# Patient Record
Sex: MALE | Race: WHITE | NOT HISPANIC OR LATINO | Employment: OTHER | URBAN - METROPOLITAN AREA
[De-identification: names, ages, dates, MRNs, and addresses within clinical notes are randomized per-mention and may not be internally consistent; named-entity substitution may affect disease eponyms.]

---

## 2016-08-09 LAB — CRC RECOMMENDATION EXT: NORMAL

## 2021-09-23 ENCOUNTER — HISTORICAL (OUTPATIENT)
Dept: LAB | Facility: HOSPITAL | Age: 66
End: 2021-09-23

## 2021-09-23 LAB
ABS NEUT (OLG): 3.81
ALBUMIN SERPL-MCNC: 4.2 GM/DL (ref 3.4–4.8)
ALBUMIN/GLOB SERPL: 1 RATIO (ref 1.1–2)
ALP SERPL-CCNC: 86 UNIT/L (ref 40–150)
ALT SERPL-CCNC: 18 UNIT/L (ref 0–55)
APPEARANCE, UA: ABNORMAL
AST SERPL-CCNC: 26 UNIT/L (ref 5–34)
BACTERIA SPEC CULT: ABNORMAL
BASOPHILS # BLD AUTO: 0 X10(3)/MCL
BASOPHILS NFR BLD AUTO: 0 %
BILIRUB SERPL-MCNC: 1.8 MG/DL
BILIRUB UR QL STRIP: ABNORMAL
BILIRUBIN DIRECT+TOT PNL SERPL-MCNC: 0.6 MG/DL (ref 0–0.5)
BILIRUBIN DIRECT+TOT PNL SERPL-MCNC: 1.2 MG/DL
BUN SERPL-MCNC: 11 MG/DL (ref 8.4–25.7)
CALCIUM SERPL-MCNC: 9.8 MG/DL (ref 8.8–10)
CHLORIDE SERPL-SCNC: 104 MMOL/L (ref 98–107)
CHOLEST SERPL-MCNC: 183 MG/DL
CHOLEST/HDLC SERPL: 4 {RATIO} (ref 0–5)
CO2 SERPL-SCNC: 26 MEQ/L (ref 23–31)
COLOR UR: YELLOW
CREAT SERPL-MCNC: 0.81 MG/DL (ref 0.73–1.18)
EOSINOPHIL # BLD AUTO: 0.11 X10(3)/MCL
EOSINOPHIL NFR BLD AUTO: 2 %
ERYTHROCYTE [DISTWIDTH] IN BLOOD BY AUTOMATED COUNT: 13 %
GLOBULIN SER-MCNC: 4 GM/DL (ref 2.4–3.5)
GLUCOSE (UA): NEGATIVE
GLUCOSE SERPL-MCNC: 109 MG/DL (ref 82–115)
HCT VFR BLD AUTO: 44.8 % (ref 39–49)
HDLC SERPL-MCNC: 52 MG/DL (ref 35–60)
HGB BLD-MCNC: 15.2 GM/DL (ref 12.6–16.6)
HGB UR QL STRIP: ABNORMAL
IMM GRANULOCYTES # BLD AUTO: 0 10*3/UL (ref 0–0.1)
IMM GRANULOCYTES NFR BLD AUTO: 0 % (ref 0–1)
KETONES UR QL STRIP: ABNORMAL
LDLC SERPL CALC-MCNC: 114 MG/DL (ref 50–140)
LEUKOCYTE ESTERASE UR QL STRIP: NEGATIVE
LYMPHOCYTES # BLD AUTO: 1.22 X10(3)/MCL
LYMPHOCYTES NFR BLD AUTO: 22.2 %
MCH RBC QN AUTO: 33.9 PG (ref 27–33)
MCHC RBC AUTO-ENTMCNC: 33.9 GM/DL (ref 32–35)
MCV RBC AUTO: 99.8 FL (ref 84–97)
MONOCYTES # BLD AUTO: 0.35 X10(3)/MCL
MONOCYTES NFR BLD AUTO: 6.4 %
MUCOUS THREADS URNS QL MICRO: PRESENT
NEUTROPHILS # BLD AUTO: 3.81 X10(3)/MCL
NEUTROPHILS NFR BLD AUTO: 69.4 %
NITRITE UR QL STRIP: NEGATIVE
PH UR STRIP: 5.5 [PH] (ref 4.6–8)
PLATELET # BLD AUTO: 103 X10(3)/MCL (ref 140–450)
PMV BLD AUTO: 10 FL
POTASSIUM SERPL-SCNC: 3.5 MMOL/L (ref 3.5–5.1)
PROT SERPL-MCNC: 8.2 GM/DL (ref 5.8–7.6)
PROT UR QL STRIP: ABNORMAL
PSA SERPL-MCNC: 1.1 NG/ML
RBC # BLD AUTO: 4.49 X10(6)/MCL (ref 4.3–5.6)
RBC #/AREA URNS HPF: ABNORMAL /[HPF]
SODIUM SERPL-SCNC: 140 MMOL/L (ref 136–145)
SP GR UR STRIP: 1.02 (ref 1–1.03)
SQUAMOUS EPITHELIAL, UA: ABNORMAL
TRIGL SERPL-MCNC: 84 MG/DL (ref 34–140)
TSH SERPL-ACNC: 1.83 UIU/ML (ref 0.35–4.94)
UROBILINOGEN UR STRIP-ACNC: 0.2
VLDLC SERPL CALC-MCNC: 17 MG/DL
WBC # SPEC AUTO: 5.49 X10(3)/MCL (ref 3.4–9.2)
WBC #/AREA URNS HPF: ABNORMAL /HPF

## 2021-09-27 ENCOUNTER — HISTORICAL (OUTPATIENT)
Dept: LAB | Facility: HOSPITAL | Age: 66
End: 2021-09-27

## 2021-09-27 LAB
ABS NEUT (OLG): 3.6
BASOPHILS # BLD AUTO: 0.02 X10(3)/MCL
BASOPHILS NFR BLD AUTO: 0.4 %
EOSINOPHIL # BLD AUTO: 0.09 X10(3)/MCL
EOSINOPHIL NFR BLD AUTO: 1.6 %
ERYTHROCYTE [DISTWIDTH] IN BLOOD BY AUTOMATED COUNT: 13 %
FOLATE SERPL-MCNC: 12.7 NG/ML (ref 7–31.4)
HCT VFR BLD AUTO: 45.6 % (ref 39–49)
HGB BLD-MCNC: 15.4 GM/DL (ref 12.6–16.6)
IMM GRANULOCYTES # BLD AUTO: 0.01 10*3/UL (ref 0–0.1)
IMM GRANULOCYTES NFR BLD AUTO: 0.2 % (ref 0–1)
INR PPP: 1
LYMPHOCYTES # BLD AUTO: 1.36 X10(3)/MCL
LYMPHOCYTES NFR BLD AUTO: 24 %
MCH RBC QN AUTO: 33.9 PG (ref 27–33)
MCHC RBC AUTO-ENTMCNC: 33.8 GM/DL (ref 32–35)
MCV RBC AUTO: 100.4 FL (ref 84–97)
MONOCYTES # BLD AUTO: 0.58 X10(3)/MCL
MONOCYTES NFR BLD AUTO: 10.2 %
NEUTROPHILS # BLD AUTO: 3.6 X10(3)/MCL
NEUTROPHILS NFR BLD AUTO: 63.6 %
PLATELET # BLD AUTO: 147 X10(3)/MCL (ref 140–450)
PMV BLD AUTO: 9 FL
PROTHROMBIN TIME: 10.4 SECOND(S) (ref 9.1–10.9)
RBC # BLD AUTO: 4.54 X10(6)/MCL (ref 4.3–5.6)
VIT B12 SERPL-MCNC: 334 PG/ML (ref 213–816)
WBC # SPEC AUTO: 5.66 X10(3)/MCL (ref 3.4–9.2)

## 2021-09-28 ENCOUNTER — HISTORICAL (OUTPATIENT)
Dept: RADIOLOGY | Facility: HOSPITAL | Age: 66
End: 2021-09-28

## 2022-01-07 ENCOUNTER — HISTORICAL (OUTPATIENT)
Dept: RADIOLOGY | Facility: HOSPITAL | Age: 67
End: 2022-01-07

## 2022-01-10 ENCOUNTER — HISTORICAL (OUTPATIENT)
Dept: RADIOLOGY | Facility: HOSPITAL | Age: 67
End: 2022-01-10

## 2022-03-24 ENCOUNTER — HISTORICAL (OUTPATIENT)
Dept: LAB | Facility: HOSPITAL | Age: 67
End: 2022-03-24

## 2022-03-24 LAB
ABS NEUT (OLG): 3.64
BASOPHILS # BLD AUTO: 0.01 10*3/UL
BASOPHILS NFR BLD AUTO: 0.2 %
EOSINOPHIL # BLD AUTO: 0.14 10*3/UL
EOSINOPHIL NFR BLD AUTO: 2.4 %
ERYTHROCYTE [DISTWIDTH] IN BLOOD BY AUTOMATED COUNT: 14 %
HCT VFR BLD AUTO: 45.9 % (ref 39–49)
HGB BLD-MCNC: 15 G/DL (ref 12.6–16.6)
IMM GRANULOCYTES # BLD AUTO: 0.01 10*3/UL (ref 0–0.1)
IMM GRANULOCYTES NFR BLD AUTO: 0.2 % (ref 0–1)
LYMPHOCYTES # BLD AUTO: 1.62 10*3/UL
LYMPHOCYTES NFR BLD AUTO: 27.5 %
MANUAL DIFF? (OHS): NO
MCH RBC QN AUTO: 33.8 PG (ref 27–33)
MCHC RBC AUTO-ENTMCNC: 32.7 G/DL (ref 32–35)
MCV RBC AUTO: 103.4 FL (ref 84–97)
MONOCYTES # BLD AUTO: 0.48 10*3/UL
MONOCYTES NFR BLD AUTO: 8.1 %
NEUTROPHILS # BLD AUTO: 3.64 10*3/UL
NEUTROPHILS NFR BLD AUTO: 61.6 %
PLATELET # BLD AUTO: 182 10*3/UL (ref 140–450)
PMV BLD AUTO: 9 FL
RBC # BLD AUTO: 4.44 10*6/UL (ref 4.3–5.6)
WBC # SPEC AUTO: 5.9 10*3/UL (ref 3.4–9.2)

## 2022-04-11 ENCOUNTER — HISTORICAL (OUTPATIENT)
Dept: ADMINISTRATIVE | Facility: HOSPITAL | Age: 67
End: 2022-04-11
Payer: MEDICARE

## 2022-04-27 VITALS
HEIGHT: 68 IN | DIASTOLIC BLOOD PRESSURE: 79 MMHG | OXYGEN SATURATION: 99 % | SYSTOLIC BLOOD PRESSURE: 161 MMHG | BODY MASS INDEX: 22.05 KG/M2 | WEIGHT: 145.5 LBS

## 2022-05-26 DIAGNOSIS — Z00.00 WELLNESS EXAMINATION: Primary | ICD-10-CM

## 2022-05-26 DIAGNOSIS — D75.89 MACROCYTOSIS WITHOUT ANEMIA: ICD-10-CM

## 2022-05-26 DIAGNOSIS — E53.8 VITAMIN B12 DEFICIENCY: ICD-10-CM

## 2022-05-26 RX ORDER — LANOLIN ALCOHOL/MO/W.PET/CERES
1000 CREAM (GRAM) TOPICAL DAILY
COMMUNITY

## 2022-05-26 RX ORDER — ENALAPRIL MALEATE AND HYDROCHLOROTHIAZIDE 10; 25 MG/1; MG/1
1 TABLET ORAL DAILY
COMMUNITY
Start: 2021-09-21 | End: 2022-07-11 | Stop reason: SDUPTHER

## 2022-06-29 LAB — CRC RECOMMENDATION EXT: NORMAL

## 2022-07-11 DIAGNOSIS — I10 HYPERTENSION, UNSPECIFIED TYPE: Primary | ICD-10-CM

## 2022-07-11 RX ORDER — ENALAPRIL MALEATE AND HYDROCHLOROTHIAZIDE 10; 25 MG/1; MG/1
1 TABLET ORAL DAILY
Qty: 30 TABLET | Refills: 5 | Status: SHIPPED | OUTPATIENT
Start: 2022-07-11 | End: 2022-11-08

## 2022-11-08 ENCOUNTER — OFFICE VISIT (OUTPATIENT)
Dept: FAMILY MEDICINE | Facility: CLINIC | Age: 67
End: 2022-11-08
Payer: MEDICARE

## 2022-11-08 VITALS
TEMPERATURE: 98 F | DIASTOLIC BLOOD PRESSURE: 78 MMHG | BODY MASS INDEX: 21.7 KG/M2 | HEIGHT: 68 IN | WEIGHT: 143.19 LBS | RESPIRATION RATE: 18 BRPM | HEART RATE: 118 BPM | OXYGEN SATURATION: 98 % | SYSTOLIC BLOOD PRESSURE: 118 MMHG

## 2022-11-08 DIAGNOSIS — R09.82 POST-NASAL DRIP: ICD-10-CM

## 2022-11-08 DIAGNOSIS — R11.2 NAUSEA AND VOMITING, UNSPECIFIED VOMITING TYPE: Primary | ICD-10-CM

## 2022-11-08 PROBLEM — Z86.19 HISTORY OF HEPATITIS C VIRUS INFECTION: Status: ACTIVE | Noted: 2022-11-08

## 2022-11-08 PROBLEM — D75.89 MACROCYTOSIS WITHOUT ANEMIA: Status: ACTIVE | Noted: 2022-11-08

## 2022-11-08 PROBLEM — E53.8 VITAMIN B12 DEFICIENCY: Status: ACTIVE | Noted: 2022-11-08

## 2022-11-08 PROBLEM — G25.0 ESSENTIAL TREMOR: Status: ACTIVE | Noted: 2022-11-08

## 2022-11-08 PROBLEM — I10 HYPERTENSION: Status: ACTIVE | Noted: 2022-11-08

## 2022-11-08 PROBLEM — Z86.0100 HISTORY OF COLONIC POLYPS: Status: ACTIVE | Noted: 2022-11-08

## 2022-11-08 PROBLEM — Z86.010 HISTORY OF COLONIC POLYPS: Status: ACTIVE | Noted: 2022-11-08

## 2022-11-08 PROCEDURE — 3078F PR MOST RECENT DIASTOLIC BLOOD PRESSURE < 80 MM HG: ICD-10-PCS | Mod: CPTII,,, | Performed by: FAMILY MEDICINE

## 2022-11-08 PROCEDURE — 99213 OFFICE O/P EST LOW 20 MIN: CPT | Mod: ,,, | Performed by: FAMILY MEDICINE

## 2022-11-08 PROCEDURE — 3008F PR BODY MASS INDEX (BMI) DOCUMENTED: ICD-10-PCS | Mod: CPTII,,, | Performed by: FAMILY MEDICINE

## 2022-11-08 PROCEDURE — 1159F MED LIST DOCD IN RCRD: CPT | Mod: CPTII,,, | Performed by: FAMILY MEDICINE

## 2022-11-08 PROCEDURE — 99213 PR OFFICE/OUTPT VISIT, EST, LEVL III, 20-29 MIN: ICD-10-PCS | Mod: ,,, | Performed by: FAMILY MEDICINE

## 2022-11-08 PROCEDURE — 3074F PR MOST RECENT SYSTOLIC BLOOD PRESSURE < 130 MM HG: ICD-10-PCS | Mod: CPTII,,, | Performed by: FAMILY MEDICINE

## 2022-11-08 PROCEDURE — 3008F BODY MASS INDEX DOCD: CPT | Mod: CPTII,,, | Performed by: FAMILY MEDICINE

## 2022-11-08 PROCEDURE — 4010F PR ACE/ARB THEARPY RXD/TAKEN: ICD-10-PCS | Mod: CPTII,,, | Performed by: FAMILY MEDICINE

## 2022-11-08 PROCEDURE — 4010F ACE/ARB THERAPY RXD/TAKEN: CPT | Mod: CPTII,,, | Performed by: FAMILY MEDICINE

## 2022-11-08 PROCEDURE — 3074F SYST BP LT 130 MM HG: CPT | Mod: CPTII,,, | Performed by: FAMILY MEDICINE

## 2022-11-08 PROCEDURE — 1160F PR REVIEW ALL MEDS BY PRESCRIBER/CLIN PHARMACIST DOCUMENTED: ICD-10-PCS | Mod: CPTII,,, | Performed by: FAMILY MEDICINE

## 2022-11-08 PROCEDURE — 1160F RVW MEDS BY RX/DR IN RCRD: CPT | Mod: CPTII,,, | Performed by: FAMILY MEDICINE

## 2022-11-08 PROCEDURE — 1159F PR MEDICATION LIST DOCUMENTED IN MEDICAL RECORD: ICD-10-PCS | Mod: CPTII,,, | Performed by: FAMILY MEDICINE

## 2022-11-08 PROCEDURE — 3078F DIAST BP <80 MM HG: CPT | Mod: CPTII,,, | Performed by: FAMILY MEDICINE

## 2022-11-08 RX ORDER — NITROGLYCERIN 0.4 MG/1
TABLET SUBLINGUAL
COMMUNITY
Start: 2022-10-13

## 2022-11-08 RX ORDER — ASPIRIN 81 MG
81 TABLET,CHEWABLE ORAL DAILY
COMMUNITY
Start: 2022-10-13

## 2022-11-08 RX ORDER — ENALAPRIL MALEATE 10 MG/1
10 TABLET ORAL DAILY
COMMUNITY
Start: 2022-10-13 | End: 2023-05-10

## 2022-11-08 NOTE — PROGRESS NOTES
Patient ID: [id number]     Chief Complaint: Abdominal Cramping and Nausea        HPI:     Mina Da Silva is a 67 y.o. male here today for Abdominal Cramping and Nausea. Nausea started 2-3 weeks ago. Abdominal pain started on 11/4/22. Rates it as 2/10 on pain scale and states it is a dull pain. Pain has been present nearly constantly. Nothing makes it better or worse. Vomits mucous. Beltching helps with nausea.     Last colonoscopy was 2-3 months ago and nothing was found of concern he states. Has not had an EGD in the past.       ----------------------------  Essential tremor  History of hepatitis C  HTN (hypertension)  Macrocytosis without anemia  Personal history of colonic polyps      Comment:  s/p polypectomy Dr Tomas Casey  Vitamin B12 deficiency     Past Surgical History:   Procedure Laterality Date    CATARACT EXTRACTION  2020    COLONOSCOPY  2022    Dr Tomas Casey    COLONOSCOPY W/ BIOPSIES AND POLYPECTOMY  08/09/2016    3 polyps - Dr Tomas Casey    LUMBAR LAMINECTOMY WITH DISCECTOMY  02/18/2022    Dr Bruna Cesar    TONSILLECTOMY  1964       Review of patient's allergies indicates:   Allergen Reactions    Codeine Hives       Outpatient Medications Marked as Taking for the 11/8/22 encounter (Office Visit) with Robert Welch, DO   Medication Sig Dispense Refill    ASPIRIN CHILDRENS 81 mg Chew Take 81 mg by mouth once daily.      cyanocobalamin (VITAMIN B-12) 1000 MCG tablet Take 100 mcg by mouth once daily.      enalapril (VASOTEC) 10 MG tablet Take 10 mg by mouth once daily.      nitroGLYCERIN (NITROSTAT) 0.4 MG SL tablet Place under the tongue.         Social History     Socioeconomic History    Marital status:    Tobacco Use    Smoking status: Every Day     Packs/day: 1.00     Types: Cigarettes    Smokeless tobacco: Never   Substance and Sexual Activity    Alcohol use: Yes    Drug use: Never    Sexual activity: Yes        Family History   Problem Relation Age of Onset    Hypertension Mother      "Liver cancer Mother     Coronary artery disease Father         CABG x4    Heart disease Father     Hypertension Father     Stroke Sister     Stroke Brother         Patient Care Team:  Robert Welch DO as PCP - General (Family Medicine)  Lalo Mcdonnell MD as Consulting Physician (Cardiology)  Bruna Cesar MD as Consulting Physician (Neurosurgery)  Son STAN MD Carmela as Consulting Physician (Gastroenterology)     Subjective:     Review of Systems   Constitutional:  Negative for chills, fever and weight loss.   Respiratory:  Positive for shortness of breath.    Cardiovascular:  Negative for chest pain.   Gastrointestinal:  Positive for abdominal pain, heartburn, nausea and vomiting. Negative for blood in stool, constipation, diarrhea and melena.   Neurological:  Negative for dizziness and headaches.     See HPI for details  All Other ROS: Negative except as stated in HPI.       Objective:     /78   Pulse (!) 118   Temp 97.7 °F (36.5 °C) (Tympanic)   Resp 18   Ht 5' 7.72" (1.72 m)   Wt 65 kg (143 lb 3.2 oz)   SpO2 98%   BMI 21.96 kg/m²     Physical Exam  Vitals reviewed.   Constitutional:       General: He is not in acute distress.     Appearance: Normal appearance. He is not ill-appearing.   Cardiovascular:      Rate and Rhythm: Normal rate and regular rhythm.      Pulses: Normal pulses.      Heart sounds: Normal heart sounds. No murmur heard.    No friction rub. No gallop.   Pulmonary:      Effort: No respiratory distress.      Breath sounds: No wheezing, rhonchi or rales.   Abdominal:      General: Abdomen is flat. Bowel sounds are normal. There is no distension.      Palpations: There is no mass.      Tenderness: There is abdominal tenderness. There is no guarding.      Comments: RUQ   Musculoskeletal:         General: No swelling.      Right lower leg: No edema.      Left lower leg: No edema.   Skin:     General: Skin is warm and dry.   Neurological:      General: No focal deficit present.      " Mental Status: He is alert.   Psychiatric:         Mood and Affect: Mood normal.         Behavior: Behavior normal.       Assessment/Plan:     1. Nausea and vomiting, unspecified vomiting type    -Abdominal pain may be due to strain from vomiting. Advised patient to contact clinic if suddenly worsens.   -Patient recently had COVID. Suspect post-nasal drip as cause for nausea and vomiting of mucous. See below for plan for post-nasal drip  -If post-nasal drip symptoms improve and nausea persists. Recommend patient try Pepcid and Omeprazole over the counter. If this does not resolve symptoms or symptoms suddenly worsen will start generalized workup and refer to his GI doctor for possible EGD.   2. Post-nasal drip  -Recommended starting with Flonase and Mucinex for 2 weeks.     Follow up:     Follow up in about 3 months (around 2/8/2023) for Wellness. In addition to their scheduled follow up, the patient has also been instructed to follow up on as needed basis.

## 2022-11-28 ENCOUNTER — DOCUMENTATION ONLY (OUTPATIENT)
Dept: ADMINISTRATIVE | Facility: HOSPITAL | Age: 67
End: 2022-11-28
Payer: MEDICARE

## 2023-02-08 ENCOUNTER — OFFICE VISIT (OUTPATIENT)
Dept: FAMILY MEDICINE | Facility: CLINIC | Age: 68
End: 2023-02-08
Payer: MEDICARE

## 2023-02-08 ENCOUNTER — LAB VISIT (OUTPATIENT)
Dept: LAB | Facility: HOSPITAL | Age: 68
End: 2023-02-08
Attending: FAMILY MEDICINE
Payer: MEDICARE

## 2023-02-08 VITALS
OXYGEN SATURATION: 98 % | HEART RATE: 81 BPM | TEMPERATURE: 97 F | HEIGHT: 68 IN | DIASTOLIC BLOOD PRESSURE: 75 MMHG | WEIGHT: 147 LBS | SYSTOLIC BLOOD PRESSURE: 134 MMHG | BODY MASS INDEX: 22.28 KG/M2 | RESPIRATION RATE: 20 BRPM

## 2023-02-08 DIAGNOSIS — L57.0 ACTINIC KERATOSIS DUE TO EXPOSURE TO SUNLIGHT: ICD-10-CM

## 2023-02-08 DIAGNOSIS — R53.83 FATIGUE, UNSPECIFIED TYPE: ICD-10-CM

## 2023-02-08 DIAGNOSIS — I10 PRIMARY HYPERTENSION: ICD-10-CM

## 2023-02-08 DIAGNOSIS — Z00.00 ROUTINE GENERAL MEDICAL EXAMINATION AT A HEALTH CARE FACILITY: ICD-10-CM

## 2023-02-08 DIAGNOSIS — Z12.5 SCREENING FOR MALIGNANT NEOPLASM OF PROSTATE: ICD-10-CM

## 2023-02-08 DIAGNOSIS — Z00.00 ROUTINE GENERAL MEDICAL EXAMINATION AT A HEALTH CARE FACILITY: Primary | ICD-10-CM

## 2023-02-08 DIAGNOSIS — I10 PRIMARY HYPERTENSION: Chronic | ICD-10-CM

## 2023-02-08 DIAGNOSIS — Z13.6 SCREENING FOR CARDIOVASCULAR CONDITION: Primary | ICD-10-CM

## 2023-02-08 DIAGNOSIS — Z00.00 WELLNESS EXAMINATION: ICD-10-CM

## 2023-02-08 PROBLEM — B19.20 HEPATITIS C: Status: ACTIVE | Noted: 2023-02-08

## 2023-02-08 PROBLEM — D12.6 TUBULAR ADENOMA OF COLON: Status: ACTIVE | Noted: 2023-02-08

## 2023-02-08 PROBLEM — R74.8 ELEVATED LIVER ENZYMES: Status: ACTIVE | Noted: 2023-02-08

## 2023-02-08 PROBLEM — R06.83 SNORING: Status: ACTIVE | Noted: 2023-02-08

## 2023-02-08 PROBLEM — K64.8 INTERNAL HEMORRHOIDS: Status: ACTIVE | Noted: 2023-02-08

## 2023-02-08 PROBLEM — K57.30 DIVERTICULOSIS OF COLON: Status: ACTIVE | Noted: 2023-02-08

## 2023-02-08 PROBLEM — D12.6 BENIGN NEOPLASM OF COLON: Status: ACTIVE | Noted: 2023-02-08

## 2023-02-08 LAB
ALBUMIN SERPL-MCNC: 4.2 G/DL (ref 3.4–4.8)
ALBUMIN/GLOB SERPL: 1 RATIO (ref 1.1–2)
ALP SERPL-CCNC: 101 UNIT/L (ref 40–150)
ALT SERPL-CCNC: 13 UNIT/L (ref 0–55)
APPEARANCE UR: CLEAR
AST SERPL-CCNC: 21 UNIT/L (ref 5–34)
BACTERIA #/AREA URNS AUTO: ABNORMAL /HPF
BASOPHILS # BLD AUTO: 0.04 X10(3)/MCL (ref 0–0.2)
BASOPHILS NFR BLD AUTO: 0.4 %
BILIRUB UR QL STRIP.AUTO: NEGATIVE MG/DL
BILIRUBIN DIRECT+TOT PNL SERPL-MCNC: 1.1 MG/DL
BUN SERPL-MCNC: 9 MG/DL (ref 8.4–25.7)
CALCIUM SERPL-MCNC: 9.9 MG/DL (ref 8.8–10)
CHLORIDE SERPL-SCNC: 105 MMOL/L (ref 98–107)
CHOLEST SERPL-MCNC: 208 MG/DL
CHOLEST/HDLC SERPL: 4 {RATIO} (ref 0–5)
CO2 SERPL-SCNC: 25 MMOL/L (ref 23–31)
COLOR UR AUTO: YELLOW
CREAT SERPL-MCNC: 1.07 MG/DL (ref 0.73–1.18)
EOSINOPHIL # BLD AUTO: 0.16 X10(3)/MCL (ref 0–0.9)
EOSINOPHIL NFR BLD AUTO: 1.6 %
ERYTHROCYTE [DISTWIDTH] IN BLOOD BY AUTOMATED COUNT: 13.3 % (ref 11.5–17)
GFR SERPLBLD CREATININE-BSD FMLA CKD-EPI: >60 MLS/MIN/1.73/M2
GLOBULIN SER-MCNC: 4.2 GM/DL (ref 2.4–3.5)
GLUCOSE SERPL-MCNC: 109 MG/DL (ref 82–115)
GLUCOSE UR QL STRIP.AUTO: NEGATIVE MG/DL
HCT VFR BLD AUTO: 41.5 % (ref 42–52)
HDLC SERPL-MCNC: 49 MG/DL (ref 35–60)
HGB BLD-MCNC: 14.2 GM/DL (ref 14–18)
IMM GRANULOCYTES # BLD AUTO: 0.04 X10(3)/MCL (ref 0–0.04)
IMM GRANULOCYTES NFR BLD AUTO: 0.4 %
KETONES UR QL STRIP.AUTO: ABNORMAL MG/DL
LDLC SERPL CALC-MCNC: 142 MG/DL (ref 50–140)
LEUKOCYTE ESTERASE UR QL STRIP.AUTO: NEGATIVE UNIT/L
LYMPHOCYTES # BLD AUTO: 1.8 X10(3)/MCL (ref 0.6–4.6)
LYMPHOCYTES NFR BLD AUTO: 17.5 %
MCH RBC QN AUTO: 34.5 PG
MCHC RBC AUTO-ENTMCNC: 34.2 MG/DL (ref 33–36)
MCV RBC AUTO: 101 FL (ref 80–94)
MONOCYTES # BLD AUTO: 0.67 X10(3)/MCL (ref 0.1–1.3)
MONOCYTES NFR BLD AUTO: 6.5 %
MUCOUS THREADS URNS QL MICRO: ABNORMAL /LPF
NEUTROPHILS # BLD AUTO: 7.57 X10(3)/MCL (ref 2.1–9.2)
NEUTROPHILS NFR BLD AUTO: 73.6 %
NITRITE UR QL STRIP.AUTO: NEGATIVE
NRBC BLD AUTO-RTO: 0 %
PH UR STRIP.AUTO: 5.5 [PH]
PLATELET # BLD AUTO: 247 X10(3)/MCL (ref 130–400)
PMV BLD AUTO: 9 FL (ref 7.4–10.4)
POTASSIUM SERPL-SCNC: 4.7 MMOL/L (ref 3.5–5.1)
PROT SERPL-MCNC: 8.4 GM/DL (ref 5.8–7.6)
PROT UR QL STRIP.AUTO: NEGATIVE MG/DL
PSA SERPL-MCNC: 1.65 NG/ML
RBC # BLD AUTO: 4.11 X10(6)/MCL (ref 4.7–6.1)
RBC #/AREA URNS AUTO: ABNORMAL /HPF
RBC UR QL AUTO: NEGATIVE UNIT/L
SODIUM SERPL-SCNC: 140 MMOL/L (ref 136–145)
SP GR UR STRIP.AUTO: >=1.03
SQUAMOUS #/AREA URNS AUTO: ABNORMAL /HPF
TRIGL SERPL-MCNC: 85 MG/DL (ref 34–140)
TSH SERPL-ACNC: 2.04 UIU/ML (ref 0.35–4.94)
UROBILINOGEN UR STRIP-ACNC: 0.2 MG/DL
VLDLC SERPL CALC-MCNC: 17 MG/DL
WBC # SPEC AUTO: 10.3 X10(3)/MCL (ref 4.5–11.5)
WBC #/AREA URNS AUTO: ABNORMAL /HPF

## 2023-02-08 PROCEDURE — G0439 PR MEDICARE ANNUAL WELLNESS SUBSEQUENT VISIT: ICD-10-PCS | Mod: ,,, | Performed by: FAMILY MEDICINE

## 2023-02-08 PROCEDURE — 1160F PR REVIEW ALL MEDS BY PRESCRIBER/CLIN PHARMACIST DOCUMENTED: ICD-10-PCS | Mod: CPTII,,, | Performed by: FAMILY MEDICINE

## 2023-02-08 PROCEDURE — 1126F AMNT PAIN NOTED NONE PRSNT: CPT | Mod: CPTII,,, | Performed by: FAMILY MEDICINE

## 2023-02-08 PROCEDURE — 3288F FALL RISK ASSESSMENT DOCD: CPT | Mod: CPTII,,, | Performed by: FAMILY MEDICINE

## 2023-02-08 PROCEDURE — 3288F PR FALLS RISK ASSESSMENT DOCUMENTED: ICD-10-PCS | Mod: CPTII,,, | Performed by: FAMILY MEDICINE

## 2023-02-08 PROCEDURE — 81001 URINALYSIS AUTO W/SCOPE: CPT

## 2023-02-08 PROCEDURE — 85025 COMPLETE CBC W/AUTO DIFF WBC: CPT

## 2023-02-08 PROCEDURE — 1159F PR MEDICATION LIST DOCUMENTED IN MEDICAL RECORD: ICD-10-PCS | Mod: CPTII,,, | Performed by: FAMILY MEDICINE

## 2023-02-08 PROCEDURE — 80061 LIPID PANEL: CPT

## 2023-02-08 PROCEDURE — 3078F DIAST BP <80 MM HG: CPT | Mod: CPTII,,, | Performed by: FAMILY MEDICINE

## 2023-02-08 PROCEDURE — 3008F PR BODY MASS INDEX (BMI) DOCUMENTED: ICD-10-PCS | Mod: CPTII,,, | Performed by: FAMILY MEDICINE

## 2023-02-08 PROCEDURE — 80053 COMPREHEN METABOLIC PANEL: CPT

## 2023-02-08 PROCEDURE — 3075F PR MOST RECENT SYSTOLIC BLOOD PRESS GE 130-139MM HG: ICD-10-PCS | Mod: CPTII,,, | Performed by: FAMILY MEDICINE

## 2023-02-08 PROCEDURE — 1159F MED LIST DOCD IN RCRD: CPT | Mod: CPTII,,, | Performed by: FAMILY MEDICINE

## 2023-02-08 PROCEDURE — 84443 ASSAY THYROID STIM HORMONE: CPT

## 2023-02-08 PROCEDURE — 1101F PT FALLS ASSESS-DOCD LE1/YR: CPT | Mod: CPTII,,, | Performed by: FAMILY MEDICINE

## 2023-02-08 PROCEDURE — 36415 COLL VENOUS BLD VENIPUNCTURE: CPT

## 2023-02-08 PROCEDURE — 3008F BODY MASS INDEX DOCD: CPT | Mod: CPTII,,, | Performed by: FAMILY MEDICINE

## 2023-02-08 PROCEDURE — 1160F RVW MEDS BY RX/DR IN RCRD: CPT | Mod: CPTII,,, | Performed by: FAMILY MEDICINE

## 2023-02-08 PROCEDURE — 3078F PR MOST RECENT DIASTOLIC BLOOD PRESSURE < 80 MM HG: ICD-10-PCS | Mod: CPTII,,, | Performed by: FAMILY MEDICINE

## 2023-02-08 PROCEDURE — 3075F SYST BP GE 130 - 139MM HG: CPT | Mod: CPTII,,, | Performed by: FAMILY MEDICINE

## 2023-02-08 PROCEDURE — 1101F PR PT FALLS ASSESS DOC 0-1 FALLS W/OUT INJ PAST YR: ICD-10-PCS | Mod: CPTII,,, | Performed by: FAMILY MEDICINE

## 2023-02-08 PROCEDURE — 84153 ASSAY OF PSA TOTAL: CPT

## 2023-02-08 PROCEDURE — G0439 PPPS, SUBSEQ VISIT: HCPCS | Mod: ,,, | Performed by: FAMILY MEDICINE

## 2023-02-08 PROCEDURE — 1126F PR PAIN SEVERITY QUANTIFIED, NO PAIN PRESENT: ICD-10-PCS | Mod: CPTII,,, | Performed by: FAMILY MEDICINE

## 2023-02-08 NOTE — PROGRESS NOTES
Patient ID: [id number]     Chief Complaint: Medicare AWV        HPI:     Mina Da Silva is a 68 y.o. male here today for a Medicare Wellness. No other complaints today. Doing well overall today.         ----------------------------  Diverticulosis  Essential tremor  History of hepatitis C  HTN (hypertension)  Macrocytosis without anemia  Personal history of colonic polyps      Comment:  s/p polypectomy Dr Tomas Casey  Vitamin B12 deficiency     Past Surgical History:   Procedure Laterality Date    CATARACT EXTRACTION  2020    COLONOSCOPY  2022    Dr Tomas Casey    COLONOSCOPY W/ BIOPSIES AND POLYPECTOMY  08/09/2016    3 polyps - Dr Tomas Casey    LUMBAR LAMINECTOMY WITH DISCECTOMY  02/18/2022    Dr Bruna Cesar    TONSILLECTOMY  1964       Review of patient's allergies indicates:   Allergen Reactions    Codeine Hives       Outpatient Medications Marked as Taking for the 2/8/23 encounter (Office Visit) with Robert Welch DO   Medication Sig Dispense Refill    ASPIRIN CHILDRENS 81 mg Chew Take 81 mg by mouth once daily.      cyanocobalamin (VITAMIN B-12) 1000 MCG tablet Take 100 mcg by mouth once daily.      enalapril (VASOTEC) 10 MG tablet Take 10 mg by mouth once daily.      nitroGLYCERIN (NITROSTAT) 0.4 MG SL tablet Place under the tongue.         Social History     Socioeconomic History    Marital status:    Tobacco Use    Smoking status: Every Day     Packs/day: 0.25     Types: Cigarettes    Smokeless tobacco: Never   Substance and Sexual Activity    Alcohol use: Yes    Drug use: Never    Sexual activity: Yes        Family History   Problem Relation Age of Onset    Hypertension Mother     Liver cancer Mother     Coronary artery disease Father         CABG x4    Heart disease Father     Hypertension Father     Stroke Sister     Stroke Brother         Patient Care Team:  Robert Welch DO as PCP - General (Family Medicine)  Lalo Mcdonnell MD as Consulting Physician (Cardiology)  Bruna Cesar MD as  Consulting Physician (Neurosurgery)  Tomas PIERCE MD Carmela as Consulting Physician (Gastroenterology)  Hazel Kohli MD as Consulting Physician (Ophthalmology)       Subjective:     Review of Systems   Constitutional:  Negative for chills and fever.   Respiratory:  Negative for shortness of breath.    Cardiovascular:  Negative for chest pain.   Gastrointestinal:  Negative for constipation and diarrhea.   Musculoskeletal:  Negative for falls.   Neurological:  Negative for dizziness and headaches.   Psychiatric/Behavioral:  The patient does not have insomnia.        Patient Reported Health Risk Assessments:  What [age] 65-69  Are you male or female?: Male  During the past four weeks, how much have you been bothered by emotional problems such as feeling anxious, depressed, irritable, sad, or downhearted and blue?: Not at all  During the past five weeks, has your physical and/or emotional health limited your social activities with family, friends, neighbors, or groups?: Not at all  During the past four weeks, how much bodily pain have you generally had?: No pain  During the past four weeks, was someone available to help if you needed and wanted help?: Yes, as much as I wanted  During the past four weeks, what was the hardest physical activity you could do for at least two minutes?: Moderate  Can you get to places out of walking distance without help?  (For example, can you travel alone on buses or taxis, or drive your own car?): Yes  Can you go shopping for groceries or clothes without someone's help?: Yes  Can you prepare your own meals?: Yes  Can you do your own housework without help?: Yes  Because of any health problems, do you need the help of another person with your personal care needs such as eating, bathing, dressing, or getting around the house?: No  Can you handle your own money without help?: Yes  During the past four weeks, how would you rate your health in general?: Very good  How have things been  "going for you during the past four weeks?: Very well  Are you having difficulties driving your car?: No  Do you always fasten your seat belt when you are in a car?: Yes, usually  How often in the past four weeks have you been bothered by falling or dizzy when standing up?: Never  How often in the past four weeks have you been bothered by sexual problems?: Never  How often in the past four weeks have you been bothered by trouble eating well?: Never  How often in the past four weeks have you been bothered by teeth or denture problems?: Never  How often in the past four weeks have you been bothered with problems using the telephone?: Never  How often in the past four weeks have you been bothered by tiredness or fatigue?: Never  Have you fallen two or more times in the past year?: No  Are you afraid of falling?: No  Are you a smoker?: Yes, I'm not ready to quit  During the past four weeks, how many drinks of wine, beer, or other alcoholic beverages did you have?: No alcohol at all  Do you exercise for about 20 minutes three or more days a week?: Yes, some of the time  Have you been given any information to help you with hazards in your house that might hurt you?: Yes  Have you been given any information to help you with keeping track of your medications?: Yes  How often do you have trouble taking medicines the way you've been told to take them?: I always take them as prescribed  How confident are you that you can control and manage most of your health problems?: Very confident  What is your race? (Check all that apply.):     Objective:     /75 (BP Location: Right arm, Patient Position: Sitting, BP Method: Large (Automatic))   Pulse 81   Temp 97.2 °F (36.2 °C)   Resp 20   Ht 5' 8" (1.727 m)   Wt 66.7 kg (147 lb)   SpO2 98%   BMI 22.35 kg/m²     Physical Exam  Vitals reviewed.   Constitutional:       General: He is not in acute distress.     Appearance: Normal appearance. He is not ill-appearing. "   Cardiovascular:      Rate and Rhythm: Normal rate and regular rhythm.      Pulses: Normal pulses.      Heart sounds: Normal heart sounds. No murmur heard.    No friction rub. No gallop.   Pulmonary:      Effort: No respiratory distress.      Breath sounds: No wheezing, rhonchi or rales.   Musculoskeletal:         General: No swelling.      Right lower leg: No edema.      Left lower leg: No edema.   Skin:     General: Skin is warm and dry.      Findings: Lesion present.   Neurological:      General: No focal deficit present.      Mental Status: He is alert.   Psychiatric:         Mood and Affect: Mood normal.         Behavior: Behavior normal.           Assessment:       ICD-10-CM ICD-9-CM   1. Routine general medical examination at a health care facility  Z00.00 V70.0   2. Actinic keratosis due to exposure to sunlight  L57.0 702.0   3. Screening for malignant neoplasm of prostate  Z12.5 V76.44   4. Primary hypertension  I10 401.9        Plan:       Medicare Annual Wellness and Personalized Prevention Plan:     Fall Risk + Home Safety + Living Situation + Whisper Test + Depression Screen + CAGE + Cognitive Impairment Screen + ADL Screen + Timed Get Up and Go + Nutrition Screen + PAQ Screen + Health Risk Assessment all reviewed.     No flowsheet data found.  Fall Risk Assessment - Outpatient 2/8/2023   Mobility Status Ambulatory   Number of falls 0   Identified as fall risk 0                   Depression Screening  Over the past two weeks, has the patient felt down, depressed, or hopeless?: No  Over the past two weeks, has the patient felt little interest or pleasure in doing things?: No  Functional Ability/Safety Screening  Was the patient's timed Up & Go test unsteady or longer than 30 seconds?: No  Does the patient need help with phone, transportation, shopping, preparing meals, housework, laundry, meds, or managing money?: No  Does the patient's home have rugs in the hallway, lack grab bars in the bathroom, lack  handrails on the stairs or have poor lighting?: No  Cognitive Function (Assessed through direct observation with due consideration of information obtained by way of patient reports and/or concerns raised by family, friends, caretakers, or others)    Does the patient repeat questions/statements in the same day?: No  Does the patient have trouble remembering the date, year, and time?: No  Does the patient have difficulty managing finances?: No  Does the patient have a decreased sense of direction?: No         Alcohol/Tobacco Use - Stressed importance of smoking cessation and limiting alcohol intake.  CVD Risk Factors - Reviewed  Obesity/Physical Activity - Normal BMI. Encouraged daily 30 minute physical activity x 5 days per week.    Opioid Screening: Patient medication list reviewed, patient is not taking prescription opioids. Patient is not using additional opioids than prescribed. Patient is at low risk of substance abuse based on this opioid use history.        Health Maintenance Topics with due status: Not Due       Topic Last Completion Date    Lipid Panel 09/23/2021    Colorectal Cancer Screening 06/29/2022          Prostate Cancer Screening - Will order PSA today.  Follow up annually.   Lab Results   Component Value Date    PSA 1.10 09/23/2021        Colon Cancer Screening - Colonoscopy on 6/29/22.   Eye Exam - Recommend annually  Dental Exam - Recommend biannually  Vaccinations -   Immunization History   Administered Date(s) Administered    COVID-19, MRNA, LN-S, PF (MODERNA FULL 0.5 ML DOSE) 06/15/2021, 08/19/2021        Advance Care Planning   Advanced Care Planning: I offered to discuss Advanced Care Planning, which consists of how you would like to be cared for as you end the near of your natural life.  This includes how to pick a person who would make decisions for you if you were unable to make them for yourself, which is called a Medical Power of . These discussions include what kind of  decisions you will make regarding life sustaining measures, such as ventilators and feeding tubes, when faced with a life limiting illness recorded on a living will that they will need to know.  Spent 20 minutes with the patient/family on the importance of Advanced Care Planning.          1. Routine general medical examination at a health care facility  - Comprehensive Metabolic Panel; Future  - Lipid Panel; Future  - TSH; Future    2. Actinic keratosis due to exposure to sunlight  - Ambulatory referral/consult to Dermatology; Future    3. Screening for malignant neoplasm of prostate  - PSA, Screening; Future    4. Primary hypertension  - CBC Auto Differential; Future        Medication List with Changes/Refills   Current Medications    ASPIRIN CHILDRENS 81 MG CHEW    Take 81 mg by mouth once daily.       Start Date: 10/13/2022End Date: --    CYANOCOBALAMIN (VITAMIN B-12) 1000 MCG TABLET    Take 100 mcg by mouth once daily.       Start Date: --        End Date: --    ENALAPRIL (VASOTEC) 10 MG TABLET    Take 10 mg by mouth once daily.       Start Date: 10/13/2022End Date: --    NITROGLYCERIN (NITROSTAT) 0.4 MG SL TABLET    Place under the tongue.       Start Date: 10/13/2022End Date: --          The patient's Health Maintenance was reviewed and the following appears to be due at this time:   Health Maintenance Due   Topic Date Due    Hepatitis C Screening  Never done    Pneumococcal Vaccines (Age 65+) (1 - PCV) Never done    TETANUS VACCINE  Never done    Shingles Vaccine (1 of 2) Never done    COVID-19 Vaccine (3 - Booster for Moderna series) 10/14/2021    Influenza Vaccine (1) Never done           Follow up in about 6 months (around 8/8/2023). In addition to their scheduled follow up, the patient has also been instructed to follow up on as needed basis.     Provided patient with a 5-10 year written screening schedule and personal prevention plan. Recommendations were developed using the USPSTF age appropriate  recommendations. Education, counseling, and referrals were provided as needed. After Visit Summary printed and given to patient which includes a list of additional screenings\tests needed.

## 2023-02-17 ENCOUNTER — DOCUMENTATION ONLY (OUTPATIENT)
Dept: FAMILY MEDICINE | Facility: CLINIC | Age: 68
End: 2023-02-17
Payer: MEDICARE

## 2023-02-24 ENCOUNTER — TELEPHONE (OUTPATIENT)
Dept: FAMILY MEDICINE | Facility: CLINIC | Age: 68
End: 2023-02-24
Payer: MEDICARE

## 2023-02-24 NOTE — TELEPHONE ENCOUNTER
----- Message from Robert Welch DO sent at 2/23/2023  4:40 PM CST -----  All lab results within acceptable ranges.

## 2023-05-10 DIAGNOSIS — I10 ESSENTIAL (PRIMARY) HYPERTENSION: ICD-10-CM

## 2023-05-10 RX ORDER — ENALAPRIL MALEATE 10 MG/1
TABLET ORAL
Qty: 90 TABLET | Refills: 2 | Status: SHIPPED | OUTPATIENT
Start: 2023-05-10 | End: 2024-02-20

## 2023-08-08 ENCOUNTER — OFFICE VISIT (OUTPATIENT)
Dept: FAMILY MEDICINE | Facility: CLINIC | Age: 68
End: 2023-08-08
Payer: MEDICARE

## 2023-08-08 VITALS
DIASTOLIC BLOOD PRESSURE: 74 MMHG | RESPIRATION RATE: 18 BRPM | OXYGEN SATURATION: 97 % | BODY MASS INDEX: 23.34 KG/M2 | TEMPERATURE: 98 F | HEIGHT: 68 IN | WEIGHT: 154 LBS | HEART RATE: 92 BPM | SYSTOLIC BLOOD PRESSURE: 117 MMHG

## 2023-08-08 DIAGNOSIS — I10 PRIMARY HYPERTENSION: ICD-10-CM

## 2023-08-08 DIAGNOSIS — I10 PRIMARY HYPERTENSION: Primary | Chronic | ICD-10-CM

## 2023-08-08 DIAGNOSIS — Z12.5 SCREENING FOR MALIGNANT NEOPLASM OF PROSTATE: ICD-10-CM

## 2023-08-08 DIAGNOSIS — Z00.00 ROUTINE GENERAL MEDICAL EXAMINATION AT A HEALTH CARE FACILITY: Primary | ICD-10-CM

## 2023-08-08 PROCEDURE — 1126F AMNT PAIN NOTED NONE PRSNT: CPT | Mod: CPTII,,, | Performed by: FAMILY MEDICINE

## 2023-08-08 PROCEDURE — 3008F PR BODY MASS INDEX (BMI) DOCUMENTED: ICD-10-PCS | Mod: CPTII,,, | Performed by: FAMILY MEDICINE

## 2023-08-08 PROCEDURE — 3078F PR MOST RECENT DIASTOLIC BLOOD PRESSURE < 80 MM HG: ICD-10-PCS | Mod: CPTII,,, | Performed by: FAMILY MEDICINE

## 2023-08-08 PROCEDURE — 3008F BODY MASS INDEX DOCD: CPT | Mod: CPTII,,, | Performed by: FAMILY MEDICINE

## 2023-08-08 PROCEDURE — 99213 PR OFFICE/OUTPT VISIT, EST, LEVL III, 20-29 MIN: ICD-10-PCS | Mod: ,,, | Performed by: FAMILY MEDICINE

## 2023-08-08 PROCEDURE — 1159F MED LIST DOCD IN RCRD: CPT | Mod: CPTII,,, | Performed by: FAMILY MEDICINE

## 2023-08-08 PROCEDURE — 99213 OFFICE O/P EST LOW 20 MIN: CPT | Mod: ,,, | Performed by: FAMILY MEDICINE

## 2023-08-08 PROCEDURE — 1160F RVW MEDS BY RX/DR IN RCRD: CPT | Mod: CPTII,,, | Performed by: FAMILY MEDICINE

## 2023-08-08 PROCEDURE — 1160F PR REVIEW ALL MEDS BY PRESCRIBER/CLIN PHARMACIST DOCUMENTED: ICD-10-PCS | Mod: CPTII,,, | Performed by: FAMILY MEDICINE

## 2023-08-08 PROCEDURE — 3074F SYST BP LT 130 MM HG: CPT | Mod: CPTII,,, | Performed by: FAMILY MEDICINE

## 2023-08-08 PROCEDURE — 3074F PR MOST RECENT SYSTOLIC BLOOD PRESSURE < 130 MM HG: ICD-10-PCS | Mod: CPTII,,, | Performed by: FAMILY MEDICINE

## 2023-08-08 PROCEDURE — 4010F PR ACE/ARB THEARPY RXD/TAKEN: ICD-10-PCS | Mod: CPTII,,, | Performed by: FAMILY MEDICINE

## 2023-08-08 PROCEDURE — 4010F ACE/ARB THERAPY RXD/TAKEN: CPT | Mod: CPTII,,, | Performed by: FAMILY MEDICINE

## 2023-08-08 PROCEDURE — 3078F DIAST BP <80 MM HG: CPT | Mod: CPTII,,, | Performed by: FAMILY MEDICINE

## 2023-08-08 PROCEDURE — 1159F PR MEDICATION LIST DOCUMENTED IN MEDICAL RECORD: ICD-10-PCS | Mod: CPTII,,, | Performed by: FAMILY MEDICINE

## 2023-08-08 PROCEDURE — 1126F PR PAIN SEVERITY QUANTIFIED, NO PAIN PRESENT: ICD-10-PCS | Mod: CPTII,,, | Performed by: FAMILY MEDICINE

## 2023-08-08 NOTE — PROGRESS NOTES
Patient ID: [id number]     Chief Complaint: Follow-up        HPI:     Mina Da Silva is a 68 y.o. male here today for Follow-up for chronic medical conditions. Recently had melanoma and basal cell carcinoma removed. He stopped taking his BP medication and has noticed his bp has been averaging around 110-120 systolic.       ----------------------------  Basal cell carcinoma (BCC)  Diverticulosis  Essential tremor  History of hepatitis C  HTN (hypertension)  Internal hemorrhoids  Macrocytosis without anemia  Melanoma  Personal history of colonic polyps      Comment:  s/p polypectomy Dr Tomas Casey  Vitamin B12 deficiency     Past Surgical History:   Procedure Laterality Date    BASAL CELL CARCINOMA EXCISION  03/2023    Dr Charles Santamaria    CATARACT EXTRACTION  2020    CLOSURE OF DEFECT OF MOHS PROCEDURE Left 04/03/2023    upper lip - Dr Charles Santamaria    COLONOSCOPY  06/29/2022    Dr Tomas Casey    COLONOSCOPY W/ BIOPSIES AND POLYPECTOMY  08/09/2016    3 polyps - Dr Tomas Casey    EXCISION OF MELANOMA Right 07/10/2023    t[place] - Dr Charles Santamaria    LUMBAR LAMINECTOMY WITH DISCECTOMY  02/18/2022    Dr Bruna Cesar    TONSILLECTOMY  1964       Review of patient's allergies indicates:   Allergen Reactions    Codeine Hives       Outpatient Medications Marked as Taking for the 8/8/23 encounter (Office Visit) with Robert Welch, DO   Medication Sig Dispense Refill    ASPIRIN CHILDRENS 81 mg Chew Take 81 mg by mouth once daily.      cyanocobalamin (VITAMIN B-12) 1000 MCG tablet Take 100 mcg by mouth once daily.      enalapril (VASOTEC) 10 MG tablet TAKE 1 TABLET BY MOUTH EVERY DAY 90 tablet 2    nitroGLYCERIN (NITROSTAT) 0.4 MG SL tablet Place under the tongue.         Social History     Socioeconomic History    Marital status:    Tobacco Use    Smoking status: Every Day     Current packs/day: 0.25     Types: Cigarettes    Smokeless tobacco: Never   Substance and Sexual Activity    Alcohol use: Yes    Drug use: Never     Sexual activity: Yes     Social Determinants of Health     Financial Resource Strain: Low Risk  (8/8/2023)    Overall Financial Resource Strain (CARDIA)     Difficulty of Paying Living Expenses: Not hard at all   Food Insecurity: No Food Insecurity (8/8/2023)    Hunger Vital Sign     Worried About Running Out of Food in the Last Year: Never true     Ran Out of Food in the Last Year: Never true   Transportation Needs: No Transportation Needs (8/8/2023)    PRAPARE - Transportation     Lack of Transportation (Medical): No     Lack of Transportation (Non-Medical): No   Physical Activity: Sufficiently Active (8/8/2023)    Exercise Vital Sign     Days of Exercise per Week: 5 days     Minutes of Exercise per Session: 30 min   Stress: No Stress Concern Present (8/8/2023)    [employer] [place] [employer] - Occupational Stress Questionnaire     Feeling of Stress : Not at all   Social Connections: Socially Integrated (8/8/2023)    Social Connection and Isolation Panel [NHANES]     Frequency of Communication with Friends and Family: More than three times a week     Frequency of Social Gatherings with Friends and Family: Three times a week     Attends Buddhist Services: 1 to 4 times per year     Active Member of Clubs or Organizations: No     Attends Club or Organization Meetings: 1 to 4 times per year     Marital Status:    Housing Stability: Low Risk  (8/8/2023)    Housing Stability Vital Sign     Unable to Pay for Housing in the Last Year: No     Number of Places Lived in the Last Year: 1     Unstable Housing in the Last Year: No        Family History   Problem Relation Age of Onset    Hypertension Mother     Liver cancer Mother     Coronary artery disease Father         CABG x4    Heart disease Father     Hypertension Father     Stroke Sister     Stroke Brother         Patient Care Team:  Robert Welch DO as PCP - General (Family Medicine)  Lalo Mcdonnell MD as Consulting Physician  "(Cardiology)  Bruna Cesar MD as Consulting Physician (Neurosurgery)  Tomas Casey MD as Consulting Physician (Gastroenterology)  Hazel Kohli MD as Consulting Physician (Ophthalmology)  Aida Lombardo MD (Dermatology)  Charles Santamaria MD as Consulting Physician (Dermatology)     Subjective:     Review of Systems   Constitutional:  Negative for chills and fever.   Respiratory:  Negative for shortness of breath.    Cardiovascular:  Negative for chest pain.   Gastrointestinal:  Negative for constipation and diarrhea.   Neurological:  Negative for headaches.   Psychiatric/Behavioral:  The patient does not have insomnia.        See HPI for details  All Other ROS: Negative except as stated in HPI.       Objective:     /74   Pulse 92   Temp 98.4 °F (36.9 °C) (Tympanic)   Resp 18   Ht 5' 7.72" (1.72 m)   Wt 69.9 kg (154 lb)   SpO2 97%   BMI 23.61 kg/m²     Physical Exam  Vitals reviewed.   Constitutional:       General: He is not in acute distress.     Appearance: Normal appearance. He is not ill-appearing.   Cardiovascular:      Rate and Rhythm: Normal rate and regular rhythm.      Pulses: Normal pulses.      Heart sounds: Normal heart sounds. No murmur heard.     No friction rub. No gallop.   Pulmonary:      Effort: No respiratory distress.      Breath sounds: No wheezing, rhonchi or rales.   Musculoskeletal:         General: No swelling.      Right lower leg: No edema.      Left lower leg: No edema.   Skin:     General: Skin is warm and dry.   Neurological:      General: No focal deficit present.      Mental Status: He is alert.   Psychiatric:         Mood and Affect: Mood normal.         Behavior: Behavior normal.         Assessment/Plan:     1. Primary hypertension      Will continue to monitor, ok with patient stopping medication so long as he BP stays less than 130 systolic.     Follow up:     Follow up in about 6 months (around 2/8/2024) for Medicare Wellness. In addition to their " scheduled follow up, the patient has also been instructed to follow up on as needed basis.

## 2023-09-21 ENCOUNTER — OFFICE VISIT (OUTPATIENT)
Dept: FAMILY MEDICINE | Facility: CLINIC | Age: 68
End: 2023-09-21
Payer: MEDICARE

## 2023-09-21 ENCOUNTER — LAB VISIT (OUTPATIENT)
Dept: LAB | Facility: HOSPITAL | Age: 68
End: 2023-09-21
Attending: FAMILY MEDICINE
Payer: MEDICARE

## 2023-09-21 VITALS
RESPIRATION RATE: 20 BRPM | HEART RATE: 89 BPM | DIASTOLIC BLOOD PRESSURE: 88 MMHG | SYSTOLIC BLOOD PRESSURE: 143 MMHG | BODY MASS INDEX: 23.92 KG/M2 | TEMPERATURE: 97 F | OXYGEN SATURATION: 97 % | HEIGHT: 68 IN | WEIGHT: 157.81 LBS
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
## 2023-09-21 DIAGNOSIS — S30.861A TICK BITE OF ABDOMINAL WALL, INITIAL ENCOUNTER: ICD-10-CM

## 2023-09-21 DIAGNOSIS — R14.0 ABDOMINAL BLOATING: ICD-10-CM

## 2023-09-21 DIAGNOSIS — R14.0 ABDOMINAL BLOATING: Primary | ICD-10-CM

## 2023-09-21 LAB
ALBUMIN SERPL-MCNC: 4.4 G/DL (ref 3.4–4.8)
ALBUMIN/GLOB SERPL: 1.1 RATIO (ref 1.1–2)
ALP SERPL-CCNC: 78 UNIT/L (ref 40–150)
ALT SERPL-CCNC: 39 UNIT/L (ref 0–55)
AST SERPL-CCNC: 57 UNIT/L (ref 5–34)
BASOPHILS # BLD AUTO: 0.03 X10(3)/MCL
BASOPHILS NFR BLD AUTO: 0.4 %
BILIRUB SERPL-MCNC: 0.8 MG/DL
BUN SERPL-MCNC: 8 MG/DL (ref 8.4–25.7)
CALCIUM SERPL-MCNC: 9.3 MG/DL (ref 8.8–10)
CHLORIDE SERPL-SCNC: 106 MMOL/L (ref 98–107)
CO2 SERPL-SCNC: 24 MMOL/L (ref 23–31)
CREAT SERPL-MCNC: 0.84 MG/DL (ref 0.73–1.18)
EOSINOPHIL # BLD AUTO: 0.1 X10(3)/MCL (ref 0–0.9)
EOSINOPHIL NFR BLD AUTO: 1.5 %
ERYTHROCYTE [DISTWIDTH] IN BLOOD BY AUTOMATED COUNT: 13.1 % (ref 11.5–17)
GFR SERPLBLD CREATININE-BSD FMLA CKD-EPI: >60 MLS/MIN/1.73/M2
GLOBULIN SER-MCNC: 4.1 GM/DL (ref 2.4–3.5)
GLUCOSE SERPL-MCNC: 85 MG/DL (ref 82–115)
HCT VFR BLD AUTO: 43.5 % (ref 42–52)
HGB BLD-MCNC: 15.1 G/DL (ref 14–18)
IMM GRANULOCYTES # BLD AUTO: 0.01 X10(3)/MCL (ref 0–0.04)
IMM GRANULOCYTES NFR BLD AUTO: 0.1 %
LYMPHOCYTES # BLD AUTO: 2.11 X10(3)/MCL (ref 0.6–4.6)
LYMPHOCYTES NFR BLD AUTO: 31.5 %
MCH RBC QN AUTO: 34.3 PG (ref 27–31)
MCHC RBC AUTO-ENTMCNC: 34.7 G/DL (ref 33–36)
MCV RBC AUTO: 98.9 FL (ref 80–94)
MONOCYTES # BLD AUTO: 0.5 X10(3)/MCL (ref 0.1–1.3)
MONOCYTES NFR BLD AUTO: 7.5 %
NEUTROPHILS # BLD AUTO: 3.95 X10(3)/MCL (ref 2.1–9.2)
NEUTROPHILS NFR BLD AUTO: 59 %
NRBC BLD AUTO-RTO: 0 %
PLATELET # BLD AUTO: 177 X10(3)/MCL (ref 130–400)
PMV BLD AUTO: 8.9 FL (ref 7.4–10.4)
POTASSIUM SERPL-SCNC: 3.5 MMOL/L (ref 3.5–5.1)
PROT SERPL-MCNC: 8.5 GM/DL (ref 5.8–7.6)
RBC # BLD AUTO: 4.4 X10(6)/MCL (ref 4.7–6.1)
SODIUM SERPL-SCNC: 142 MMOL/L (ref 136–145)
WBC # SPEC AUTO: 6.7 X10(3)/MCL (ref 4.5–11.5)

## 2023-09-21 PROCEDURE — 36415 COLL VENOUS BLD VENIPUNCTURE: CPT

## 2023-09-21 PROCEDURE — 3008F PR BODY MASS INDEX (BMI) DOCUMENTED: ICD-10-PCS | Mod: CPTII,,,

## 2023-09-21 PROCEDURE — 3079F DIAST BP 80-89 MM HG: CPT | Mod: CPTII,,,

## 2023-09-21 PROCEDURE — 1125F AMNT PAIN NOTED PAIN PRSNT: CPT | Mod: CPTII,,,

## 2023-09-21 PROCEDURE — 3079F PR MOST RECENT DIASTOLIC BLOOD PRESSURE 80-89 MM HG: ICD-10-PCS | Mod: CPTII,,,

## 2023-09-21 PROCEDURE — 99214 OFFICE O/P EST MOD 30 MIN: CPT | Mod: ,,,

## 2023-09-21 PROCEDURE — 85025 COMPLETE CBC W/AUTO DIFF WBC: CPT

## 2023-09-21 PROCEDURE — 3008F BODY MASS INDEX DOCD: CPT | Mod: CPTII,,,

## 2023-09-21 PROCEDURE — 1159F MED LIST DOCD IN RCRD: CPT | Mod: CPTII,,,

## 2023-09-21 PROCEDURE — 1159F PR MEDICATION LIST DOCUMENTED IN MEDICAL RECORD: ICD-10-PCS | Mod: CPTII,,,

## 2023-09-21 PROCEDURE — 1101F PT FALLS ASSESS-DOCD LE1/YR: CPT | Mod: CPTII,,,

## 2023-09-21 PROCEDURE — 4010F PR ACE/ARB THEARPY RXD/TAKEN: ICD-10-PCS | Mod: CPTII,,,

## 2023-09-21 PROCEDURE — 4010F ACE/ARB THERAPY RXD/TAKEN: CPT | Mod: CPTII,,,

## 2023-09-21 PROCEDURE — 3288F PR FALLS RISK ASSESSMENT DOCUMENTED: ICD-10-PCS | Mod: CPTII,,,

## 2023-09-21 PROCEDURE — 1125F PR PAIN SEVERITY QUANTIFIED, PAIN PRESENT: ICD-10-PCS | Mod: CPTII,,,

## 2023-09-21 PROCEDURE — 3077F PR MOST RECENT SYSTOLIC BLOOD PRESSURE >= 140 MM HG: ICD-10-PCS | Mod: CPTII,,,

## 2023-09-21 PROCEDURE — 3288F FALL RISK ASSESSMENT DOCD: CPT | Mod: CPTII,,,

## 2023-09-21 PROCEDURE — 99214 PR OFFICE/OUTPT VISIT, EST, LEVL IV, 30-39 MIN: ICD-10-PCS | Mod: ,,,

## 2023-09-21 PROCEDURE — 80053 COMPREHEN METABOLIC PANEL: CPT

## 2023-09-21 PROCEDURE — 1101F PR PT FALLS ASSESS DOC 0-1 FALLS W/OUT INJ PAST YR: ICD-10-PCS | Mod: CPTII,,,

## 2023-09-21 PROCEDURE — 3077F SYST BP >= 140 MM HG: CPT | Mod: CPTII,,,

## 2023-09-21 PROCEDURE — 1160F RVW MEDS BY RX/DR IN RCRD: CPT | Mod: CPTII,,,

## 2023-09-21 PROCEDURE — 1160F PR REVIEW ALL MEDS BY PRESCRIBER/CLIN PHARMACIST DOCUMENTED: ICD-10-PCS | Mod: CPTII,,,

## 2023-09-21 RX ORDER — DOXYCYCLINE HYCLATE 100 MG
100 TABLET ORAL 2 TIMES DAILY
Qty: 20 TABLET | Refills: 0 | Status: SHIPPED | OUTPATIENT
Start: 2023-09-21 | End: 2023-10-01

## 2023-09-21 NOTE — PROGRESS NOTES
Patient ID: [id number]     Chief Complaint: Nausea, Abdominal Pain (Symptoms started 3 days ago), Bloated, and Tick Removal (Was bitten a week ago)      HPI:     Mina Da Silva is a 68 y.o. male here today for a problem visit. Patient [place] and was bitten by a tick. Patient states that he has been having abdominal pain and bloating x 3 days. Patient states that he has been experiencing some constipation that was relieved with Colace.     Past Medical History:   Diagnosis Date    Basal cell carcinoma (BCC)     Diverticulosis     Essential tremor     History of hepatitis C     HTN (hypertension)     Internal hemorrhoids     Macrocytosis without anemia     Melanoma 07/10/2023    Personal history of colonic polyps 08/09/2016    s/p polypectomy Dr Tomas Casey    Vitamin B12 deficiency         Past Surgical History:   Procedure Laterality Date    BASAL CELL CARCINOMA EXCISION  03/2023    Dr Charles Santamaria    CATARACT EXTRACTION  2020    CLOSURE OF DEFECT OF MOHS PROCEDURE Left 04/03/2023    upper lip - Dr Charles Santamaria    COLONOSCOPY  06/29/2022    Dr Tomas Casey    COLONOSCOPY W/ BIOPSIES AND POLYPECTOMY  08/09/2016    3 polyps - Dr Tomas Casey    EXCISION OF MELANOMA Right 07/10/2023    [facility] - Dr Charles Santamaria    LUMBAR LAMINECTOMY WITH DISCECTOMY  02/18/2022    Dr Bruna Cesar    TONSILLECTOMY  1964        Social History     Socioeconomic History    Marital status:    Tobacco Use    Smoking status: Every Day     Current packs/day: 0.25     Types: Cigarettes    Smokeless tobacco: Never   Substance and Sexual Activity    Alcohol use: Yes    Drug use: Never    Sexual activity: Yes     Social Determinants of Health     Financial Resource Strain: Low Risk  (8/8/2023)    Overall Financial Resource Strain (CARDIA)     Difficulty of Paying Living Expenses: Not hard at all   Food Insecurity: No Food Insecurity (8/8/2023)    Hunger Vital Sign     Worried About Running Out of Food in  the Last Year: Never true     Ran Out of Food in the Last Year: Never true   Transportation Needs: No Transportation Needs (8/8/2023)    PRAPARE - Transportation     Lack of Transportation (Medical): No     Lack of Transportation (Non-Medical): No   Physical Activity: Sufficiently Active (8/8/2023)    Exercise Vital Sign     Days of Exercise per Week: 5 days     Minutes of Exercise per Session: 30 min   Stress: No Stress Concern Present (8/8/2023)    [employer] [place] [employer] - Occupational Stress Questionnaire     Feeling of Stress : Not at all   Social Connections: Socially Integrated (8/8/2023)    Social Connection and Isolation Panel [NHANES]     Frequency of Communication with Friends and Family: More than three times a week     Frequency of Social Gatherings with Friends and Family: Three times a week     Attends Voodoo Services: 1 to 4 times per year     Active Member of Clubs or Organizations: No     Attends Club or Organization Meetings: 1 to 4 times per year     Marital Status:    Housing Stability: Low Risk  (8/8/2023)    Housing Stability Vital Sign     Unable to Pay for Housing in the Last Year: No     Number of Places Lived in the Last Year: 1     Unstable Housing in the Last Year: No        Current Outpatient Medications   Medication Instructions    ASPIRIN CHILDRENS 81 mg, Oral, Daily    cyanocobalamin (VITAMIN B-12) 100 mcg, Oral, Daily    doxycycline (VIBRA-TABS) 100 mg, Oral, 2 times daily    enalapril (VASOTEC) 10 MG tablet TAKE 1 TABLET BY MOUTH EVERY DAY    nitroGLYCERIN (NITROSTAT) 0.4 MG SL tablet Sublingual       Review of patient's allergies indicates:   Allergen Reactions    Codeine Hives        Patient Care Team:  Robert Welch DO as PCP - General (Family Medicine)  Lalo Mcdonnell MD as Consulting Physician (Cardiology)  Bruna Cesar MD as Consulting Physician (Neurosurgery)  Tomas Casey MD as Consulting Physician  "(Gastroenterology)  Hazel Kohli MD as Consulting Physician (Ophthalmology)  Aida Lombardo MD (Dermatology)  Charles Santamaria MD as Consulting Physician (Dermatology)     Subjective:     Review of Systems    12 point review of systems conducted, negative except as stated in the history of present illness. See HPI for details.    Objective:     Visit Vitals  BP (!) 143/88 (BP Location: Right arm, Patient Position: Sitting, BP Method: Large (Automatic))   Pulse 89   Temp 97 °F (36.1 °C)   Resp 20   Ht 5' 8" (1.727 m)   Wt 71.6 kg (157 lb 12.8 oz)   SpO2 97%   BMI 23.99 kg/m²       Physical Exam  Vitals and nursing note reviewed.   Constitutional:       General: He is not in acute distress.     Appearance: He is not ill-appearing.   HENT:      Head: Normocephalic and atraumatic.      Mouth/Throat:      Mouth: Mucous membranes are moist.      Pharynx: Oropharynx is clear.   Eyes:      General: No scleral icterus.     Extraocular Movements: Extraocular movements intact.      Conjunctiva/sclera: Conjunctivae normal.      Pupils: Pupils are equal, round, and reactive to light.   Neck:      Vascular: No carotid bruit.   Cardiovascular:      Rate and Rhythm: Normal rate and regular rhythm.      Heart sounds: No murmur heard.     No friction rub. No gallop.   Pulmonary:      Effort: Pulmonary effort is normal. No respiratory distress.      Breath sounds: Normal breath sounds. No wheezing, rhonchi or rales.   Abdominal:      General: Abdomen is flat. Bowel sounds are increased. There is no distension.      Palpations: Abdomen is soft. There is no mass.      Tenderness: There is no abdominal tenderness.   Musculoskeletal:         General: Normal range of motion.      Cervical back: Normal range of motion and neck supple.   Skin:     General: Skin is warm and dry.   Neurological:      General: No focal deficit present.      Mental Status: He is alert.   Psychiatric:         Mood and Affect: Mood normal.         Labs " "Reviewed:     Chemistry:  Lab Results   Component Value Date     02/08/2023    K 4.7 02/08/2023    CHLORIDE 105 02/08/2023    BUN 9.0 02/08/2023    CREATININE 1.07 02/08/2023    EGFRNORACEVR >60 02/08/2023    GLUCOSE 109 02/08/2023    CALCIUM 9.9 02/08/2023    ALKPHOS 101 02/08/2023    LABPROT 8.4 (H) 02/08/2023    ALBUMIN 4.2 02/08/2023    BILIDIR 0.6 (H) 09/23/2021    IBILI 1.20 09/23/2021    AST 21 02/08/2023    ALT 13 02/08/2023    TSH 2.041 02/08/2023    PSA 1.65 02/08/2023        No results found for: "HGBA1C", "MICROALBCREA"     Hematology:  Lab Results   Component Value Date    WBC 10.3 02/08/2023    HGB 14.2 02/08/2023    HCT 41.5 (L) 02/08/2023     02/08/2023       Lipid Panel:  Lab Results   Component Value Date    CHOL 208 (H) 02/08/2023    HDL 49 02/08/2023    .00 (H) 02/08/2023    TRIG 85 02/08/2023    TOTALCHOLEST 4 02/08/2023        Urine:  Lab Results   Component Value Date    COLORUA Yellow 02/08/2023    APPEARANCEUA Clear 02/08/2023    SGUA >=1.030 02/08/2023    PHUA 5.5 02/08/2023    PROTEINUA Negative 02/08/2023    GLUCOSEUA Negative 02/08/2023    KETONESUA Trace (A) 02/08/2023    BLOODUA Negative 02/08/2023    NITRITESUA Negative 02/08/2023    LEUKOCYTESUR Negative 02/08/2023    RBCUA 0-5 02/08/2023    WBCUA 0-2 02/08/2023    BACTERIA Rare 02/08/2023        Assessment:       ICD-10-CM ICD-9-CM   1. Abdominal bloating  R14.0 787.3   2. Tick bite of abdominal wall, initial encounter  S30.861A 911.4    W57.XXXA E906.4        Plan:     1. Abdominal bloating  -     CBC Auto Differential; Future; Expected date: 09/21/2023  -     Comprehensive Metabolic Panel; Future; Expected date: 09/21/2023  -     CT Abdomen Pelvis W Wo Contrast; Future; Expected date: 09/21/2023    2. Tick bite of abdominal wall, initial encounter  -     CBC Auto Differential; Future; Expected date: 09/21/2023  -     Comprehensive Metabolic Panel; Future; Expected date: 09/21/2023  -     doxycycline " (VIBRA-TABS) 100 MG tablet; Take 1 tablet (100 mg total) by mouth 2 (two) times daily. for 10 days  Dispense: 20 tablet; Refill: 0  -     CT Abdomen Pelvis W Wo Contrast; Future; Expected date: 09/21/2023         No follow-ups on file. In addition to their scheduled follow up, the patient has also been instructed to follow up on as needed basis.     Future Appointments   Date Time Provider Department Center   2/20/2024 10:15 AM Robert Welch DO [facility] NP

## 2023-09-22 ENCOUNTER — HOSPITAL ENCOUNTER (OUTPATIENT)
Dept: RADIOLOGY | Facility: HOSPITAL | Age: 68
Discharge: HOME | End: 2023-09-22
Payer: MEDICARE

## 2023-09-22 DIAGNOSIS — R14.0 ABDOMINAL BLOATING: ICD-10-CM

## 2023-09-22 DIAGNOSIS — S30.861A TICK BITE OF ABDOMINAL WALL, INITIAL ENCOUNTER: ICD-10-CM
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
## 2023-09-22 PROCEDURE — 74178 CT ABD&PLV WO CNTR FLWD CNTR: CPT | Mod: TC

## 2023-09-22 PROCEDURE — 25500020 PHARM REV CODE 255

## 2023-09-22 RX ADMIN — IOPAMIDOL 100 ML: 755 INJECTION, SOLUTION INTRAVENOUS at 09:09

## 2023-09-25 ENCOUNTER — TELEPHONE (OUTPATIENT)
Dept: FAMILY MEDICINE | Facility: CLINIC | Age: 68
End: 2023-09-25
Payer: MEDICARE

## 2023-09-25 NOTE — TELEPHONE ENCOUNTER
----- Message from Abel Asencio NP sent at 9/22/2023 10:46 AM CDT -----  Please inform patient of lab results.     1. CT Abdomen shows hepatomegaly and hepatic steatosis.  Please stress to patient to follow up with GI MD this week. Let us know if you cannot get an appointment within this week. We will call and try to get you in ASAP for further evaluation.     Thanks for all you do,   Abel

## 2023-09-25 NOTE — TELEPHONE ENCOUNTER
----- Message from Abel Asencio NP sent at 9/22/2023  8:23 AM CDT -----  Please inform patient of lab results.     1. Labs are within normal ranges. Liver enzymes slightly elevated, but expected from tick bite. Repeat labs prior to next visit.     Thanks for all you do,   Abel

## 2024-02-16 ENCOUNTER — LAB VISIT (OUTPATIENT)
Dept: LAB | Facility: HOSPITAL | Age: 69
End: 2024-02-16
Attending: FAMILY MEDICINE
Payer: MEDICARE

## 2024-02-16 DIAGNOSIS — Z00.00 ROUTINE GENERAL MEDICAL EXAMINATION AT A HEALTH CARE FACILITY: ICD-10-CM

## 2024-02-16 DIAGNOSIS — Z12.5 SCREENING FOR MALIGNANT NEOPLASM OF PROSTATE: ICD-10-CM

## 2024-02-16 DIAGNOSIS — I10 PRIMARY HYPERTENSION: ICD-10-CM

## 2024-02-16 LAB
ALBUMIN SERPL-MCNC: 4.4 G/DL (ref 3.4–4.8)
ALBUMIN/GLOB SERPL: 1.1 RATIO (ref 1.1–2)
ALP SERPL-CCNC: 84 UNIT/L (ref 40–150)
ALT SERPL-CCNC: 17 UNIT/L (ref 0–55)
AST SERPL-CCNC: 19 UNIT/L (ref 5–34)
BASOPHILS # BLD AUTO: 0.02 X10(3)/MCL
BASOPHILS NFR BLD AUTO: 0.3 %
BILIRUB SERPL-MCNC: 0.8 MG/DL
BUN SERPL-MCNC: 12 MG/DL (ref 8.4–25.7)
CALCIUM SERPL-MCNC: 9.9 MG/DL (ref 8.8–10)
CHLORIDE SERPL-SCNC: 106 MMOL/L (ref 98–107)
CHOLEST SERPL-MCNC: 238 MG/DL
CHOLEST/HDLC SERPL: 5 {RATIO} (ref 0–5)
CO2 SERPL-SCNC: 26 MMOL/L (ref 23–31)
CREAT SERPL-MCNC: 0.92 MG/DL (ref 0.73–1.18)
EOSINOPHIL # BLD AUTO: 0.13 X10(3)/MCL (ref 0–0.9)
EOSINOPHIL NFR BLD AUTO: 1.9 %
ERYTHROCYTE [DISTWIDTH] IN BLOOD BY AUTOMATED COUNT: 13.2 % (ref 11.5–17)
GFR SERPLBLD CREATININE-BSD FMLA CKD-EPI: >60 MLS/MIN/1.73/M2
GLOBULIN SER-MCNC: 4 GM/DL (ref 2.4–3.5)
GLUCOSE SERPL-MCNC: 114 MG/DL (ref 82–115)
HCT VFR BLD AUTO: 44.8 % (ref 42–52)
HDLC SERPL-MCNC: 49 MG/DL (ref 35–60)
HGB BLD-MCNC: 15.4 G/DL (ref 14–18)
IMM GRANULOCYTES # BLD AUTO: 0.02 X10(3)/MCL (ref 0–0.04)
IMM GRANULOCYTES NFR BLD AUTO: 0.3 %
LDLC SERPL CALC-MCNC: 170 MG/DL (ref 50–140)
LYMPHOCYTES # BLD AUTO: 1.8 X10(3)/MCL (ref 0.6–4.6)
LYMPHOCYTES NFR BLD AUTO: 26.7 %
MCH RBC QN AUTO: 33.3 PG (ref 27–31)
MCHC RBC AUTO-ENTMCNC: 34.4 G/DL (ref 33–36)
MCV RBC AUTO: 97 FL (ref 80–94)
MONOCYTES # BLD AUTO: 0.55 X10(3)/MCL (ref 0.1–1.3)
MONOCYTES NFR BLD AUTO: 8.2 %
NEUTROPHILS # BLD AUTO: 4.22 X10(3)/MCL (ref 2.1–9.2)
NEUTROPHILS NFR BLD AUTO: 62.6 %
NRBC BLD AUTO-RTO: 0 %
PLATELET # BLD AUTO: 165 X10(3)/MCL (ref 130–400)
PMV BLD AUTO: 9 FL (ref 7.4–10.4)
POTASSIUM SERPL-SCNC: 4.3 MMOL/L (ref 3.5–5.1)
PROT SERPL-MCNC: 8.4 GM/DL (ref 5.8–7.6)
PSA SERPL-MCNC: 1.28 NG/ML
RBC # BLD AUTO: 4.62 X10(6)/MCL (ref 4.7–6.1)
SODIUM SERPL-SCNC: 141 MMOL/L (ref 136–145)
TRIGL SERPL-MCNC: 95 MG/DL (ref 34–140)
TSH SERPL-ACNC: 2.53 UIU/ML (ref 0.35–4.94)
VLDLC SERPL CALC-MCNC: 19 MG/DL
WBC # SPEC AUTO: 6.74 X10(3)/MCL (ref 4.5–11.5)

## 2024-02-16 PROCEDURE — 36415 COLL VENOUS BLD VENIPUNCTURE: CPT

## 2024-02-16 PROCEDURE — 80061 LIPID PANEL: CPT

## 2024-02-16 PROCEDURE — 84443 ASSAY THYROID STIM HORMONE: CPT

## 2024-02-16 PROCEDURE — 84153 ASSAY OF PSA TOTAL: CPT

## 2024-02-16 PROCEDURE — 80053 COMPREHEN METABOLIC PANEL: CPT

## 2024-02-16 PROCEDURE — 85025 COMPLETE CBC W/AUTO DIFF WBC: CPT

## 2024-02-20 ENCOUNTER — OFFICE VISIT (OUTPATIENT)
Dept: FAMILY MEDICINE | Facility: CLINIC | Age: 69
End: 2024-02-20
Payer: MEDICARE

## 2024-02-20 ENCOUNTER — TELEPHONE (OUTPATIENT)
Dept: FAMILY MEDICINE | Facility: CLINIC | Age: 69
End: 2024-02-20
Payer: MEDICARE

## 2024-02-20 VITALS
RESPIRATION RATE: 18 BRPM | BODY MASS INDEX: 23.79 KG/M2 | HEART RATE: 78 BPM | SYSTOLIC BLOOD PRESSURE: 138 MMHG | WEIGHT: 157 LBS | HEIGHT: 68 IN | OXYGEN SATURATION: 96 % | TEMPERATURE: 98 F | DIASTOLIC BLOOD PRESSURE: 83 MMHG

## 2024-02-20 DIAGNOSIS — R09.89 ABNORMAL RADIAL PULSE: ICD-10-CM

## 2024-02-20 DIAGNOSIS — C43.71 MALIGNANT MELANOMA OF RIGHT LOWER LIMB, INCLUDING HIP: ICD-10-CM

## 2024-02-20 DIAGNOSIS — E78.2 MIXED HYPERLIPIDEMIA: ICD-10-CM

## 2024-02-20 DIAGNOSIS — Z00.00 ROUTINE GENERAL MEDICAL EXAMINATION AT A HEALTH CARE FACILITY: Primary | ICD-10-CM

## 2024-02-20 PROBLEM — R16.0 LARGE LIVER: Status: ACTIVE | Noted: 2024-02-20

## 2024-02-20 PROCEDURE — 1158F ADVNC CARE PLAN TLK DOCD: CPT | Mod: CPTII,,, | Performed by: FAMILY MEDICINE

## 2024-02-20 PROCEDURE — G0439 PPPS, SUBSEQ VISIT: HCPCS | Mod: ,,, | Performed by: FAMILY MEDICINE

## 2024-02-20 PROCEDURE — 1159F MED LIST DOCD IN RCRD: CPT | Mod: CPTII,,, | Performed by: FAMILY MEDICINE

## 2024-02-20 PROCEDURE — 1126F AMNT PAIN NOTED NONE PRSNT: CPT | Mod: CPTII,,, | Performed by: FAMILY MEDICINE

## 2024-02-20 PROCEDURE — 3079F DIAST BP 80-89 MM HG: CPT | Mod: CPTII,,, | Performed by: FAMILY MEDICINE

## 2024-02-20 PROCEDURE — 1160F RVW MEDS BY RX/DR IN RCRD: CPT | Mod: CPTII,,, | Performed by: FAMILY MEDICINE

## 2024-02-20 PROCEDURE — 3075F SYST BP GE 130 - 139MM HG: CPT | Mod: CPTII,,, | Performed by: FAMILY MEDICINE

## 2024-02-20 RX ORDER — KETOCONAZOLE 20 MG/ML
SHAMPOO, SUSPENSION TOPICAL
COMMUNITY
Start: 2024-02-06

## 2024-02-20 RX ORDER — ATORVASTATIN CALCIUM 20 MG/1
20 TABLET, FILM COATED ORAL DAILY
Qty: 90 TABLET | Refills: 3 | Status: SHIPPED | OUTPATIENT
Start: 2024-02-20 | End: 2025-02-19

## 2024-02-20 NOTE — TELEPHONE ENCOUNTER
----- Message from Abel Asencio NP sent at 2/16/2024 10:45 AM CST -----  Please inform patient of lab results.     1. Cholesterol elevated. Stress diet modifications.     2. Slight macrocytosis most likely due to smoking.     3. All other labs within normal ranges.     Thanks for all you do,   Abel

## 2024-02-20 NOTE — PROGRESS NOTES
Patient ID: [id number]     Chief Complaint: Medicare AWV    HPI:     Mina Da Silva is a 69 y.o. male here today for a Medicare Wellness. No other complaints today. Reviewed recent labs with patient.     ----------------------------  Abdominal bloating  Actinic keratosis due to exposure to sunlight  Basal cell carcinoma (BCC)  Benign neoplasm of colon  Diverticulosis  Elevated LFTs  Essential tremor  History of hepatitis C virus infection  HTN (hypertension)  Internal hemorrhoids  Macrocytosis without anemia  Melanoma  Personal history of colonic polyps      Comment:  s/p polypectomy Dr Tomas Casey  Snoring  Tubular adenoma of colon  Vitamin B12 deficiency     Past Surgical History:   Procedure Laterality Date    BASAL CELL CARCINOMA EXCISION  03/2023    Dr Charles Santamaria    CATARACT EXTRACTION  2020    CLOSURE OF DEFECT OF MOHS PROCEDURE Left 04/03/2023    upper lip - Dr Charles Santamaria    COLONOSCOPY  06/29/2022    Dr Tomas Casey    COLONOSCOPY W/ BIOPSIES AND POLYPECTOMY  08/09/2016    3 polyps - Dr Tomas Casey    EXCISION OF MELANOMA Right 07/10/2023    [facility] - Dr Charles Santamaria    LUMBAR LAMINECTOMY WITH DISCECTOMY  02/18/2022    Dr Bruna Cesar    TONSILLECTOMY  1964       Review of patient's allergies indicates:   Allergen Reactions    Codeine Hives       Outpatient Medications Marked as Taking for the 2/20/24 encounter (Office Visit) with Robert Welch, DO   Medication Sig Dispense Refill    ASPIRIN CHILDRENS 81 mg Chew Take 81 mg by mouth once daily.      cyanocobalamin (VITAMIN B-12) 1000 MCG tablet Take 1,000 mcg by mouth once daily.      ketoconazole (NIZORAL) 2 % shampoo APPLY TO SCALP FOR 5 MIN THEN RINSE. DO THIS DAILY FOR 1 WEEK THEN TWICE WEEKLY LONG-TERM      nitroGLYCERIN (NITROSTAT) 0.4 MG SL tablet Place under the tongue.         Social History     Socioeconomic History    Marital status:    Tobacco Use    Smoking status: Every Day     Current packs/day: 0.25     Types: Cigarettes    Smokeless  tobacco: Never   Substance and Sexual Activity    Alcohol use: Yes    Drug use: Never    Sexual activity: Yes     Social Determinants of Health     Financial Resource Strain: Low Risk  (8/8/2023)    Overall Financial Resource Strain (CARDIA)     Difficulty of Paying Living Expenses: Not hard at all   Food Insecurity: No Food Insecurity (8/8/2023)    Hunger Vital Sign     Worried About Running Out of Food in the Last Year: Never true     Ran Out of Food in the Last Year: Never true   Transportation Needs: No Transportation Needs (8/8/2023)    PRAPARE - Transportation     Lack of Transportation (Medical): No     Lack of Transportation (Non-Medical): No   Physical Activity: Sufficiently Active (8/8/2023)    Exercise Vital Sign     Days of Exercise per Week: 5 days     Minutes of Exercise per Session: 30 min   Stress: No Stress Concern Present (8/8/2023)    [employer] [place] [employer] - Occupational Stress Questionnaire     Feeling of Stress : Not at all   Social Connections: Socially Integrated (8/8/2023)    Social Connection and Isolation Panel [NHANES]     Frequency of Communication with Friends and Family: More than three times a week     Frequency of Social Gatherings with Friends and Family: Three times a week     Attends [employer] Services: 1 to 4 times per year     Active Member of Clubs or Organizations: No     Attends Club or Organization Meetings: 1 to 4 times per year     Marital Status:    Housing Stability: Low Risk  (8/8/2023)    Housing Stability Vital Sign     Unable to Pay for Housing in the Last Year: No     Number of Places Lived in the Last Year: 1     Unstable Housing in the Last Year: No        Family History   Problem Relation Age of Onset    Hypertension Mother     Liver cancer Mother     Coronary artery disease Father         CABG x4    Heart disease Father     Hypertension Father     Stroke Sister     Stroke Brother         Patient Care Team:  Robert Welch DO as PCP -  "General (Family Medicine)  Lalo Mcdonnell MD as Consulting Physician (Cardiology)  Bruna Cesar MD as Consulting Physician (Neurosurgery)  Tomas Casey MD as Consulting Physician (Gastroenterology)  Hazel Kohli MD as Consulting Physician (Ophthalmology)  Aida Lombardo MD (Dermatology)  Charles Santamaria MD as Consulting Physician (Dermatology)  Charles Cristobal MD as Consulting Physician (Dermatology)       Subjective:     Review of Systems   Constitutional:  Negative for chills and fever.   Respiratory:  Negative for shortness of breath.    Cardiovascular:  Negative for chest pain.   Gastrointestinal:  Negative for constipation and diarrhea.   Musculoskeletal:  Negative for falls.   Neurological:  Negative for dizziness and headaches.   Psychiatric/Behavioral:  The patient does not have insomnia.      Patient Reported Health Risk Assessments:       Objective:     /83   Pulse 78   Temp 97.7 °F (36.5 °C) (Temporal)   Resp 18   Ht 5' 7.72" (1.72 m)   Wt 71.2 kg (157 lb)   SpO2 96%   BMI 24.07 kg/m²     Physical Exam  Vitals reviewed.   Constitutional:       General: He is not in acute distress.     Appearance: Normal appearance. He is not ill-appearing.   Cardiovascular:      Rate and Rhythm: Normal rate and regular rhythm.      Pulses:           Carotid pulses are 2+ on the right side with bruit and 2+ on the left side.       Radial pulses are 3+ on the right side and 2+ on the left side.      Heart sounds: Normal heart sounds. No murmur heard.     No friction rub. No gallop.      Comments: 1+ ulnar pulse in RUE.   Pulmonary:      Effort: No respiratory distress.      Breath sounds: No wheezing, rhonchi or rales.   Musculoskeletal:         General: No swelling.      Right lower leg: No edema.      Left lower leg: No edema.   Skin:     General: Skin is warm and dry.   Neurological:      General: No focal deficit present.      Mental Status: He is alert.      Comments: Resting tremor in " bilateral UE.    Psychiatric:         Mood and Affect: Mood normal.         Behavior: Behavior normal.         Assessment:       ICD-10-CM ICD-9-CM   1. Routine general medical examination at a health care facility  Z00.00 V70.0   2. Malignant melanoma of right lower limb, including hip  C43.71 172.7   3. Abnormal radial pulse  R09.89 785.9   4. Mixed hyperlipidemia  E78.2 272.2        Plan:       Medicare Annual Wellness and Personalized Prevention Plan:     Fall Risk + Home Safety + Living Situation + Whisper Test + Depression Screen + CAGE + Cognitive Impairment Screen + ADL Screen + Timed Get Up and Go + Nutrition Screen + PAQ Screen + Health Risk Assessment all reviewed.          No data to display                  9/21/2023    10:40 AM 2/8/2023    10:30 AM   Fall Risk Assessment - Outpatient   Mobility Status Ambulatory Ambulatory   Number of falls 0 0   Identified as fall risk False False                             Alcohol/Tobacco Use - Stressed importance of smoking cessation and limiting alcohol intake.  CVD Risk Factors - Reviewed  Obesity/Physical Activity - Encouraged daily 30 minute physical activity x 5 days per week.    Opioid Screening: Patient medication list reviewed, patient is not taking prescription opioids. Patient is not using additional opioids than prescribed. Patient is at low risk of substance abuse based on this opioid use history.        Health Maintenance Due   Topic Date Due    Pneumococcal Vaccines (Age 65+) (1 of 2 - PCV) Never done    TETANUS VACCINE  Never done    Shingles Vaccine (1 of 2) Never done    RSV Vaccine (Age 60+ and Pregnant patients) (1 - 1-dose 60+ series) Never done    Influenza Vaccine (1) Never done    COVID-19 Vaccine (3 - 2023-24 season) 09/01/2023     Prostate Cancer Screening -    Lab Results   Component Value Date    PSA 1.28 02/16/2024    PSA 1.65 02/08/2023    PSA 1.10 09/23/2021      Vaccinations -   Immunization History   Administered Date(s) Administered     COVID-19, MRNA, LN-S, PF (MODERNA FULL 0.5 ML DOSE) 06/15/2021, 08/19/2021        Advance Care Planning     Date: 02/20/2024    Power of   I initiated the process of voluntary advance care planning today and explained the importance of this process to the patient.  I introduced the concept of advance directives to the patient, as well. Then the patient received detailed information about the importance of designating a Health Care Power of  (HCPOA). He was also instructed to communicate with this person about their wishes for future healthcare, should he become sick and lose decision-making capacity. The patient has not previously appointed a HCPOA. After our discussion, the patient has decided to complete a HCPOA. I spent a total time of 10 minutes discussing this issue with the patient.              1. Routine general medical examination at a health care facility    2. Malignant melanoma of right lower limb, including hip  -Monitored by Dermatology every 3 months.   3. Abnormal radial pulse  - CV Ultrasound doppler arterial arms bilat; Future  -Prominent brachial and radial pulse, faint ulnar pulse of RUE. Will order arterial US to evaluate further. Based on results, patient may need further evaluation and/or referral to Vascular surgery.   4. Mixed hyperlipidemia  - atorvastatin (LIPITOR) 20 MG tablet; Take 1 tablet (20 mg total) by mouth once daily.  Dispense: 90 tablet; Refill: 3      Medication List with Changes/Refills   New Medications    ATORVASTATIN (LIPITOR) 20 MG TABLET    Take 1 tablet (20 mg total) by mouth once daily.       Start Date: 2/20/2024 End Date: 2/19/2025   Current Medications    ASPIRIN CHILDRENS 81 MG CHEW    Take 81 mg by mouth once daily.       Start Date: 10/13/2022End Date: --    CYANOCOBALAMIN (VITAMIN B-12) 1000 MCG TABLET    Take 1,000 mcg by mouth once daily.       Start Date: --        End Date: --    KETOCONAZOLE (NIZORAL) 2 % SHAMPOO    APPLY TO SCALP FOR 5  MIN THEN RINSE. DO THIS DAILY FOR 1 WEEK THEN TWICE WEEKLY LONG-TERM       Start Date: 2/6/2024  End Date: --    NITROGLYCERIN (NITROSTAT) 0.4 MG SL TABLET    Place under the tongue.       Start Date: 10/13/2022End Date: --   Discontinued Medications    ENALAPRIL (VASOTEC) 10 MG TABLET    TAKE 1 TABLET BY MOUTH EVERY DAY       Start Date: 5/10/2023 End Date: 2/20/2024      Follow up in about 6 months (around 8/20/2024) for Follow up HLD. In addition to their scheduled follow up, the patient has also been instructed to follow up on as needed basis.     Provided patient with a 5-10 year written screening schedule and personal prevention plan. Recommendations were developed using the USPSTF age appropriate recommendations. Education, counseling, and referrals were provided as needed. After Visit Summary printed and given to patient which includes a list of additional screenings\tests needed.

## 2024-02-21 ENCOUNTER — HOSPITAL ENCOUNTER (OUTPATIENT)
Dept: RADIOLOGY | Facility: HOSPITAL | Age: 69
Discharge: HOME | End: 2024-02-21
Attending: FAMILY MEDICINE
Payer: MEDICARE

## 2024-02-21 DIAGNOSIS — R09.89 ABNORMAL RADIAL PULSE: ICD-10-CM

## 2024-02-21 PROCEDURE — 93930 UPPER EXTREMITY STUDY: CPT | Mod: TC

## 2024-03-05 ENCOUNTER — TELEPHONE (OUTPATIENT)
Dept: FAMILY MEDICINE | Facility: CLINIC | Age: 69
End: 2024-03-05
Payer: MEDICARE

## 2024-03-05 NOTE — TELEPHONE ENCOUNTER
----- Message from Robert Welch DO sent at 3/5/2024 10:13 AM CST -----  I have reviewed the imaging results. There are no significant abnormalities noted.

## 2024-04-08 ENCOUNTER — LAB VISIT (OUTPATIENT)
Dept: LAB | Facility: HOSPITAL | Age: 69
End: 2024-04-08
Attending: FAMILY MEDICINE
Payer: MEDICARE

## 2024-04-08 ENCOUNTER — OFFICE VISIT (OUTPATIENT)
Dept: FAMILY MEDICINE | Facility: CLINIC | Age: 69
End: 2024-04-08
Payer: MEDICARE

## 2024-04-08 VITALS
TEMPERATURE: 99 F | SYSTOLIC BLOOD PRESSURE: 133 MMHG | BODY MASS INDEX: 23.19 KG/M2 | RESPIRATION RATE: 18 BRPM | HEART RATE: 85 BPM | DIASTOLIC BLOOD PRESSURE: 80 MMHG | HEIGHT: 68 IN | WEIGHT: 153 LBS | OXYGEN SATURATION: 97 %

## 2024-04-08 DIAGNOSIS — R10.32 LEFT LOWER QUADRANT PAIN: ICD-10-CM

## 2024-04-08 DIAGNOSIS — R10.30 LOWER ABDOMINAL PAIN: Primary | ICD-10-CM

## 2024-04-08 DIAGNOSIS — R10.30 LOWER ABDOMINAL PAIN: ICD-10-CM

## 2024-04-08 LAB
ALBUMIN SERPL-MCNC: 4.4 G/DL (ref 3.4–4.8)
ALBUMIN/GLOB SERPL: 1 RATIO (ref 1.1–2)
ALP SERPL-CCNC: 86 UNIT/L (ref 40–150)
ALT SERPL-CCNC: 22 UNIT/L (ref 0–55)
AST SERPL-CCNC: 24 UNIT/L (ref 5–34)
BASOPHILS # BLD AUTO: 0.03 X10(3)/MCL
BASOPHILS NFR BLD AUTO: 0.5 %
BILIRUB SERPL-MCNC: 0.8 MG/DL
BUN SERPL-MCNC: 10 MG/DL (ref 8.4–25.7)
CALCIUM SERPL-MCNC: 9.9 MG/DL (ref 8.8–10)
CHLORIDE SERPL-SCNC: 108 MMOL/L (ref 98–107)
CO2 SERPL-SCNC: 25 MMOL/L (ref 23–31)
CREAT SERPL-MCNC: 0.99 MG/DL (ref 0.73–1.18)
EOSINOPHIL # BLD AUTO: 0.14 X10(3)/MCL (ref 0–0.9)
EOSINOPHIL NFR BLD AUTO: 2.3 %
ERYTHROCYTE [DISTWIDTH] IN BLOOD BY AUTOMATED COUNT: 13.3 % (ref 11.5–17)
GFR SERPLBLD CREATININE-BSD FMLA CKD-EPI: >60 MLS/MIN/1.73/M2
GLOBULIN SER-MCNC: 4.3 GM/DL (ref 2.4–3.5)
GLUCOSE SERPL-MCNC: 119 MG/DL (ref 82–115)
HCT VFR BLD AUTO: 42.3 % (ref 42–52)
HGB BLD-MCNC: 14.5 G/DL (ref 14–18)
IMM GRANULOCYTES # BLD AUTO: 0.01 X10(3)/MCL (ref 0–0.04)
IMM GRANULOCYTES NFR BLD AUTO: 0.2 %
LYMPHOCYTES # BLD AUTO: 1.67 X10(3)/MCL (ref 0.6–4.6)
LYMPHOCYTES NFR BLD AUTO: 27.2 %
MCH RBC QN AUTO: 33.9 PG (ref 27–31)
MCHC RBC AUTO-ENTMCNC: 34.3 G/DL (ref 33–36)
MCV RBC AUTO: 98.8 FL (ref 80–94)
MONOCYTES # BLD AUTO: 0.56 X10(3)/MCL (ref 0.1–1.3)
MONOCYTES NFR BLD AUTO: 9.1 %
NEUTROPHILS # BLD AUTO: 3.72 X10(3)/MCL (ref 2.1–9.2)
NEUTROPHILS NFR BLD AUTO: 60.7 %
NRBC BLD AUTO-RTO: 0 %
PLATELET # BLD AUTO: 141 X10(3)/MCL (ref 130–400)
PMV BLD AUTO: 9.3 FL (ref 7.4–10.4)
POTASSIUM SERPL-SCNC: 4.6 MMOL/L (ref 3.5–5.1)
PROT SERPL-MCNC: 8.7 GM/DL (ref 5.8–7.6)
RBC # BLD AUTO: 4.28 X10(6)/MCL (ref 4.7–6.1)
SODIUM SERPL-SCNC: 142 MMOL/L (ref 136–145)
WBC # SPEC AUTO: 6.13 X10(3)/MCL (ref 4.5–11.5)

## 2024-04-08 PROCEDURE — 3075F SYST BP GE 130 - 139MM HG: CPT | Mod: CPTII,,, | Performed by: FAMILY MEDICINE

## 2024-04-08 PROCEDURE — 1125F AMNT PAIN NOTED PAIN PRSNT: CPT | Mod: CPTII,,, | Performed by: FAMILY MEDICINE

## 2024-04-08 PROCEDURE — 3008F BODY MASS INDEX DOCD: CPT | Mod: CPTII,,, | Performed by: FAMILY MEDICINE

## 2024-04-08 PROCEDURE — 1159F MED LIST DOCD IN RCRD: CPT | Mod: CPTII,,, | Performed by: FAMILY MEDICINE

## 2024-04-08 PROCEDURE — 1160F RVW MEDS BY RX/DR IN RCRD: CPT | Mod: CPTII,,, | Performed by: FAMILY MEDICINE

## 2024-04-08 PROCEDURE — 36415 COLL VENOUS BLD VENIPUNCTURE: CPT

## 2024-04-08 PROCEDURE — 80053 COMPREHEN METABOLIC PANEL: CPT

## 2024-04-08 PROCEDURE — 85025 COMPLETE CBC W/AUTO DIFF WBC: CPT

## 2024-04-08 PROCEDURE — 99214 OFFICE O/P EST MOD 30 MIN: CPT | Mod: ,,, | Performed by: FAMILY MEDICINE

## 2024-04-08 PROCEDURE — 3079F DIAST BP 80-89 MM HG: CPT | Mod: CPTII,,, | Performed by: FAMILY MEDICINE

## 2024-04-08 NOTE — PROGRESS NOTES
Patient ID: [id number]     Chief Complaint: Abdominal Pain (Worsening bilateral lower abd/groin soreness with growing protrusion x last couple months. He had double hernia sx approx 28 years ago, feels like they may be coming back.)    HPI:     Mina Da Silva is a 69 y.o. male here today for Abdominal Pain (Worsening bilateral lower abd/groin soreness with growing protrusion x last couple months. He had double hernia sx approx 28 years ago, feels like they may be coming back.). Symptoms progressed about 3 weeks ago.       ----------------------------  Abdominal bloating  Actinic keratosis due to exposure to sunlight  Basal cell carcinoma (BCC)  Benign neoplasm of colon  Diverticulosis  Elevated LFTs  Essential tremor  History of hepatitis C virus infection  HTN (hypertension)  Internal hemorrhoids  Macrocytosis without anemia  Melanoma  Personal history of colonic polyps      Comment:  s/p polypectomy Dr Tomas Casey  Snoring  Tubular adenoma of colon  Vitamin B12 deficiency     Past Surgical History:   Procedure Laterality Date    BASAL CELL CARCINOMA EXCISION  03/2023    Dr Charles Santamaria    CATARACT EXTRACTION  2020    CLOSURE OF DEFECT OF MOHS PROCEDURE Left 04/03/2023    upper lip - Dr Charles Santamaria    COLONOSCOPY  06/29/2022    Dr Tomas Casey    COLONOSCOPY W/ BIOPSIES AND POLYPECTOMY  08/09/2016    3 polyps - Dr Tomas Casey    EXCISION OF MELANOMA Right 07/10/2023    taylor - Dr Charles Santamaria    LUMBAR LAMINECTOMY WITH DISCECTOMY  02/18/2022    Dr Bruna Cesar    TONSILLECTOMY  1964       Review of patient's allergies indicates:   Allergen Reactions    Codeine Hives       Outpatient Medications Marked as Taking for the 4/8/24 encounter (Office Visit) with Robert Welch,    Medication Sig Dispense Refill    ASPIRIN CHILDRENS 81 mg Chew Take 81 mg by mouth once daily.      atorvastatin (LIPITOR) 20 MG tablet Take 1 tablet (20 mg total) by mouth once daily. 90 tablet 3    cyanocobalamin (VITAMIN B-12) 1000 MCG  tablet Take 1,000 mcg by mouth once daily.      ketoconazole (NIZORAL) 2 % shampoo APPLY TO SCALP FOR 5 MIN THEN RINSE. DO THIS DAILY FOR 1 WEEK THEN TWICE WEEKLY LONG-TERM      nitroGLYCERIN (NITROSTAT) 0.4 MG SL tablet Place under the tongue.         Social History     Socioeconomic History    Marital status:    Tobacco Use    Smoking status: Every Day     Current packs/day: 0.25     Types: Cigarettes    Smokeless tobacco: Never   Substance and Sexual Activity    Alcohol use: Yes    Drug use: Never    Sexual activity: Yes     Social Determinants of Health     Financial Resource Strain: Low Risk  (8/8/2023)    Overall Financial Resource Strain (CARDIA)     Difficulty of Paying Living Expenses: Not hard at all   Food Insecurity: No Food Insecurity (8/8/2023)    Hunger Vital Sign     Worried About Running Out of Food in the Last Year: Never true     Ran Out of Food in the Last Year: Never true   Transportation Needs: No Transportation Needs (8/8/2023)    PRAPARE - Transportation     Lack of Transportation (Medical): No     Lack of Transportation (Non-Medical): No   Physical Activity: Sufficiently Active (8/8/2023)    Exercise Vital Sign     Days of Exercise per Week: 5 days     Minutes of Exercise per Session: 30 min   Stress: No Stress Concern Present (8/8/2023)    [employer] - Occupational Stress Questionnaire     Feeling of Stress : Not at all   Social Connections: Socially Integrated (8/8/2023)    Social Connection and Isolation Panel [NHANES]     Frequency of Communication with Friends and Family: More than three times a week     Frequency of Social Gatherings with Friends and Family: Three times a week     Attends Anabaptism Services: 1 to 4 times per year     Active Member of Clubs or Organizations: No     Attends Club or Organization Meetings: 1 to 4 times per year     Marital Status:    Housing Stability: Low Risk  (8/8/2023)    Housing Stability Vital Sign      "Unable to Pay for Housing in the Last Year: No     Number of Places Lived in the Last Year: 1     Unstable Housing in the Last Year: No        Family History   Problem Relation Age of Onset    Hypertension Mother     Liver cancer Mother     Coronary artery disease Father         CABG x4    Heart disease Father     Hypertension Father     Stroke Sister     Stroke Brother         Patient Care Team:  Robert Welch DO as PCP - General (Family Medicine)  Lalo Mcdonnell MD as Consulting Physician (Cardiology)  Bruna Cesar MD as Consulting Physician (Neurosurgery)  Tomas Casey MD as Consulting Physician (Gastroenterology)  Hazel Kohli MD as Consulting Physician (Ophthalmology)  Adia Lombardo MD (Dermatology)  Charles Santamaria MD as Consulting Physician (Dermatology)  Charles Cristobal MD as Consulting Physician (Dermatology)     Subjective:     Review of Systems   Constitutional:  Negative for chills and fever.   Respiratory:  Negative for shortness of breath.    Cardiovascular:  Negative for chest pain.   Gastrointestinal:  Positive for abdominal pain, constipation, diarrhea and nausea.   Neurological:  Negative for dizziness and headaches.   Psychiatric/Behavioral:  The patient does not have insomnia.        See HPI for details  All Other ROS: Negative except as stated in HPI.       Objective:     /80   Pulse 85   Temp 98.7 °F (37.1 °C) (Temporal)   Resp 18   Ht 5' 7.72" (1.72 m)   Wt 69.4 kg (153 lb)   SpO2 97%   BMI 23.46 kg/m²     Physical Exam  Vitals reviewed.   Constitutional:       General: He is not in acute distress.     Appearance: Normal appearance. He is not ill-appearing.   Cardiovascular:      Rate and Rhythm: Normal rate and regular rhythm.      Pulses: Normal pulses.      Heart sounds: Normal heart sounds. No murmur heard.     No friction rub. No gallop.   Pulmonary:      Effort: No respiratory distress.      Breath sounds: No wheezing, rhonchi or rales.   Abdominal: "      General: Bowel sounds are increased.      Tenderness: There is abdominal tenderness.      Hernia: A hernia is present.   Musculoskeletal:         General: No swelling.      Right lower leg: No edema.      Left lower leg: No edema.   Skin:     General: Skin is warm and dry.   Neurological:      General: No focal deficit present.      Mental Status: He is alert.   Psychiatric:         Mood and Affect: Mood normal.         Behavior: Behavior normal.         Assessment/Plan:     1. Lower abdominal pain  -     CBC Auto Differential; Future; Expected date: 04/08/2024  -     Comprehensive Metabolic Panel; Future; Expected date: 04/08/2024  -     Ambulatory referral/consult to General Surgery; Future; Expected date: 04/08/2024    2. Left lower quadrant pain  -     CT Abdomen Pelvis With IV Contrast Routine Oral Contrast; Future; Expected date: 04/08/2024     Cautioned patient with lifting, pushing, or pulling anything more than 15lbs.    Follow up:     Follow up if symptoms worsen or fail to improve. In addition to their scheduled follow up, the patient has also been instructed to follow up on as needed basis.

## 2024-04-15 ENCOUNTER — HOSPITAL ENCOUNTER (OUTPATIENT)
Dept: RADIOLOGY | Facility: HOSPITAL | Age: 69
Discharge: HOME | End: 2024-04-15
Attending: FAMILY MEDICINE
Payer: MEDICARE

## 2024-04-15 DIAGNOSIS — R10.32 LEFT LOWER QUADRANT PAIN: ICD-10-CM

## 2024-04-15 PROCEDURE — 74177 CT ABD & PELVIS W/CONTRAST: CPT | Mod: TC

## 2024-04-15 PROCEDURE — 25500020 PHARM REV CODE 255: Performed by: FAMILY MEDICINE

## 2024-04-15 RX ADMIN — DIATRIZOATE MEGLUMINE AND DIATRIZOATE SODIUM 30 ML: 660; 100 LIQUID ORAL; RECTAL at 08:04

## 2024-04-15 RX ADMIN — IOPAMIDOL 100 ML: 755 INJECTION, SOLUTION INTRAVENOUS at 09:04

## 2024-08-19 ENCOUNTER — LAB VISIT (OUTPATIENT)
Dept: LAB | Facility: HOSPITAL | Age: 69
End: 2024-08-19
Attending: FAMILY MEDICINE
Payer: MEDICARE

## 2024-08-19 DIAGNOSIS — E78.2 MIXED HYPERLIPIDEMIA: ICD-10-CM

## 2024-08-19 LAB
ALBUMIN SERPL-MCNC: 4 G/DL (ref 3.4–4.8)
ALBUMIN/GLOB SERPL: 1 RATIO (ref 1.1–2)
ALP SERPL-CCNC: 67 UNIT/L (ref 40–150)
ALT SERPL-CCNC: 17 UNIT/L (ref 0–55)
ANION GAP SERPL CALC-SCNC: 8 MEQ/L
AST SERPL-CCNC: 17 UNIT/L (ref 5–34)
BILIRUB SERPL-MCNC: 0.7 MG/DL
BUN SERPL-MCNC: 14 MG/DL (ref 8.4–25.7)
CALCIUM SERPL-MCNC: 9.9 MG/DL (ref 8.8–10)
CHLORIDE SERPL-SCNC: 110 MMOL/L (ref 98–107)
CHOLEST SERPL-MCNC: 232 MG/DL
CHOLEST/HDLC SERPL: 7 {RATIO} (ref 0–5)
CO2 SERPL-SCNC: 25 MMOL/L (ref 23–31)
CREAT SERPL-MCNC: 0.83 MG/DL (ref 0.73–1.18)
CREAT/UREA NIT SERPL: 17
GFR SERPLBLD CREATININE-BSD FMLA CKD-EPI: >60 ML/MIN/1.73/M2
GLOBULIN SER-MCNC: 4.2 GM/DL (ref 2.4–3.5)
GLUCOSE SERPL-MCNC: 114 MG/DL (ref 82–115)
HDLC SERPL-MCNC: 31 MG/DL (ref 35–60)
LDLC SERPL CALC-MCNC: 160 MG/DL (ref 50–140)
POTASSIUM SERPL-SCNC: 4.3 MMOL/L (ref 3.5–5.1)
PROT SERPL-MCNC: 8.2 GM/DL (ref 5.8–7.6)
SODIUM SERPL-SCNC: 143 MMOL/L (ref 136–145)
TRIGL SERPL-MCNC: 203 MG/DL (ref 34–140)
VLDLC SERPL CALC-MCNC: 41 MG/DL

## 2024-08-19 PROCEDURE — 36415 COLL VENOUS BLD VENIPUNCTURE: CPT

## 2024-08-19 PROCEDURE — 80053 COMPREHEN METABOLIC PANEL: CPT

## 2024-08-19 PROCEDURE — 80061 LIPID PANEL: CPT

## 2024-08-21 ENCOUNTER — OFFICE VISIT (OUTPATIENT)
Dept: FAMILY MEDICINE | Facility: CLINIC | Age: 69
End: 2024-08-21
Payer: MEDICARE

## 2024-08-21 ENCOUNTER — TELEPHONE (OUTPATIENT)
Dept: FAMILY MEDICINE | Facility: CLINIC | Age: 69
End: 2024-08-21

## 2024-08-21 ENCOUNTER — LAB VISIT (OUTPATIENT)
Dept: LAB | Facility: HOSPITAL | Age: 69
End: 2024-08-21
Payer: MEDICARE

## 2024-08-21 VITALS
DIASTOLIC BLOOD PRESSURE: 70 MMHG | SYSTOLIC BLOOD PRESSURE: 117 MMHG | OXYGEN SATURATION: 99 % | HEIGHT: 68 IN | WEIGHT: 155.81 LBS | TEMPERATURE: 98 F | RESPIRATION RATE: 20 BRPM | HEART RATE: 75 BPM | BODY MASS INDEX: 23.61 KG/M2

## 2024-08-21 DIAGNOSIS — E78.2 MIXED HYPERLIPIDEMIA: ICD-10-CM

## 2024-08-21 DIAGNOSIS — J06.9 ACUTE URI: ICD-10-CM

## 2024-08-21 DIAGNOSIS — T46.6X5A MYALGIA DUE TO STATIN: Primary | ICD-10-CM

## 2024-08-21 DIAGNOSIS — M79.10 MYALGIA DUE TO STATIN: Primary | ICD-10-CM

## 2024-08-21 LAB
FLUAV AG UPPER RESP QL IA.RAPID: NOT DETECTED
FLUBV AG UPPER RESP QL IA.RAPID: NOT DETECTED
SARS-COV-2 RNA RESP QL NAA+PROBE: NOT DETECTED

## 2024-08-21 PROCEDURE — 4010F ACE/ARB THERAPY RXD/TAKEN: CPT | Mod: CPTII,,,

## 2024-08-21 PROCEDURE — 99214 OFFICE O/P EST MOD 30 MIN: CPT | Mod: ,,,

## 2024-08-21 PROCEDURE — 3008F BODY MASS INDEX DOCD: CPT | Mod: CPTII,,,

## 2024-08-21 PROCEDURE — 3074F SYST BP LT 130 MM HG: CPT | Mod: CPTII,,,

## 2024-08-21 PROCEDURE — 1101F PT FALLS ASSESS-DOCD LE1/YR: CPT | Mod: CPTII,,,

## 2024-08-21 PROCEDURE — 3288F FALL RISK ASSESSMENT DOCD: CPT | Mod: CPTII,,,

## 2024-08-21 PROCEDURE — 1126F AMNT PAIN NOTED NONE PRSNT: CPT | Mod: CPTII,,,

## 2024-08-21 PROCEDURE — 0240U COVID/FLU A&B PCR: CPT

## 2024-08-21 PROCEDURE — 3078F DIAST BP <80 MM HG: CPT | Mod: CPTII,,,

## 2024-08-21 PROCEDURE — 1160F RVW MEDS BY RX/DR IN RCRD: CPT | Mod: CPTII,,,

## 2024-08-21 PROCEDURE — 1159F MED LIST DOCD IN RCRD: CPT | Mod: CPTII,,,

## 2024-08-21 RX ORDER — METHYLPREDNISOLONE 4 MG/1
TABLET ORAL
Qty: 1 EACH | Refills: 0 | Status: SHIPPED | OUTPATIENT
Start: 2024-08-21

## 2024-08-21 RX ORDER — EZETIMIBE 10 MG/1
10 TABLET ORAL DAILY
Qty: 90 TABLET | Refills: 3 | Status: SHIPPED | OUTPATIENT
Start: 2024-08-21 | End: 2025-08-21

## 2024-08-21 NOTE — ASSESSMENT & PLAN NOTE
Lab Results   Component Value Date    .00 (H) 08/19/2024    TRIG 203 (H) 08/19/2024    HDL 31 (L) 08/19/2024    TOTALCHOLEST 7 (H) 08/19/2024     Start Zetia 10 mg daily.   Recheck in 6 months.   Follow a low cholesterol, low saturated fat diet with less that 200mg of cholesterol a day.  Avoid fried foods and high saturated fats (high saturated fats less than 7% of calories).  Add Flax Seed/Fish Oil supplements to diet. Increase dietary fiber.  Regular exercise can reduce LDL and raise HDL. Stressed importance of physical activity 5 times per week for 30 minutes per day.

## 2024-08-21 NOTE — ASSESSMENT & PLAN NOTE
COVID and flu negative.  Start Medrol Dose Pack.   Discussed with the patient that antibiotics are not warranted at this time.  If he is still having symptoms and starting to run fever on day 8, we can discuss possible antibiotic treatment.    Use OTC cold meds for symptoms (HTN and DM pt to avoid Sudafed or pseudoephedrine products).  Use OTC Tylenol or Advil for fever or body aches.  Get plenty of rest, eat a healthy diet, avoid alcohol and smoking.  Sore Throat: Use OTC lozenges or throat sprays, gargle with warm salt and water, warm tea with honey.  Nasal Congestion: Use OTC Mucinex D, humidifier or steamy shower.  Cough: Use Dextromethorphan/Doxylamine Cough Syrup at night.  Report fever greater than 101 F, breathing problems, painful or worsening cough, or changes in mucous (green, yellow, bloody).  Cover your mouth with coughing, avoid sharing cups or lip balm, wash your hand before eating or touching your face.

## 2024-12-31 DIAGNOSIS — Z00.00 WELLNESS EXAMINATION: ICD-10-CM

## 2024-12-31 DIAGNOSIS — Z12.5 SCREENING PSA (PROSTATE SPECIFIC ANTIGEN): ICD-10-CM

## 2024-12-31 DIAGNOSIS — E78.2 MIXED HYPERLIPIDEMIA: ICD-10-CM

## 2024-12-31 DIAGNOSIS — I10 PRIMARY HYPERTENSION: Primary | Chronic | ICD-10-CM

## 2025-02-21 ENCOUNTER — LAB VISIT (OUTPATIENT)
Dept: LAB | Facility: HOSPITAL | Age: 70
End: 2025-02-21
Attending: FAMILY MEDICINE
Payer: MEDICARE

## 2025-02-21 DIAGNOSIS — I10 PRIMARY HYPERTENSION: ICD-10-CM

## 2025-02-21 DIAGNOSIS — E78.2 MIXED HYPERLIPIDEMIA: ICD-10-CM

## 2025-02-21 DIAGNOSIS — Z00.00 WELLNESS EXAMINATION: ICD-10-CM

## 2025-02-21 LAB
ALBUMIN SERPL-MCNC: 4.2 G/DL (ref 3.4–4.8)
ALBUMIN/GLOB SERPL: 1 RATIO (ref 1.1–2)
ALP SERPL-CCNC: 87 UNIT/L (ref 40–150)
ALT SERPL-CCNC: 15 UNIT/L (ref 0–55)
ANION GAP SERPL CALC-SCNC: 9 MEQ/L
AST SERPL-CCNC: 20 UNIT/L (ref 5–34)
BASOPHILS # BLD AUTO: 0.03 X10(3)/MCL
BASOPHILS NFR BLD AUTO: 0.5 %
BILIRUB SERPL-MCNC: 1.3 MG/DL
BUN SERPL-MCNC: 13 MG/DL (ref 8.4–25.7)
CALCIUM SERPL-MCNC: 10 MG/DL (ref 8.8–10)
CHLORIDE SERPL-SCNC: 106 MMOL/L (ref 98–107)
CHOLEST SERPL-MCNC: 193 MG/DL
CHOLEST/HDLC SERPL: 4 {RATIO} (ref 0–5)
CO2 SERPL-SCNC: 26 MMOL/L (ref 23–31)
CREAT SERPL-MCNC: 0.88 MG/DL (ref 0.72–1.25)
CREAT/UREA NIT SERPL: 15
EOSINOPHIL # BLD AUTO: 0.16 X10(3)/MCL (ref 0–0.9)
EOSINOPHIL NFR BLD AUTO: 2.9 %
ERYTHROCYTE [DISTWIDTH] IN BLOOD BY AUTOMATED COUNT: 13.5 % (ref 11.5–17)
EST. AVERAGE GLUCOSE BLD GHB EST-MCNC: 105.4 MG/DL
GFR SERPLBLD CREATININE-BSD FMLA CKD-EPI: >60 ML/MIN/1.73/M2
GLOBULIN SER-MCNC: 4.1 GM/DL (ref 2.4–3.5)
GLUCOSE SERPL-MCNC: 120 MG/DL (ref 82–115)
HBA1C MFR BLD: 5.3 %
HCT VFR BLD AUTO: 45.8 % (ref 42–52)
HCV AB SERPL QL IA: REACTIVE
HDLC SERPL-MCNC: 49 MG/DL (ref 35–60)
HGB BLD-MCNC: 15.5 G/DL (ref 14–18)
IMM GRANULOCYTES # BLD AUTO: 0.01 X10(3)/MCL (ref 0–0.04)
IMM GRANULOCYTES NFR BLD AUTO: 0.2 %
LDLC SERPL CALC-MCNC: 123 MG/DL (ref 50–140)
LYMPHOCYTES # BLD AUTO: 1.89 X10(3)/MCL (ref 0.6–4.6)
LYMPHOCYTES NFR BLD AUTO: 34.2 %
MCH RBC QN AUTO: 33 PG (ref 27–31)
MCHC RBC AUTO-ENTMCNC: 33.8 G/DL (ref 33–36)
MCV RBC AUTO: 97.4 FL (ref 80–94)
MONOCYTES # BLD AUTO: 0.41 X10(3)/MCL (ref 0.1–1.3)
MONOCYTES NFR BLD AUTO: 7.4 %
NEUTROPHILS # BLD AUTO: 3.03 X10(3)/MCL (ref 2.1–9.2)
NEUTROPHILS NFR BLD AUTO: 54.8 %
NRBC BLD AUTO-RTO: 0 %
PLATELET # BLD AUTO: 195 X10(3)/MCL (ref 130–400)
PMV BLD AUTO: 9.2 FL (ref 7.4–10.4)
POTASSIUM SERPL-SCNC: 4.3 MMOL/L (ref 3.5–5.1)
PROT SERPL-MCNC: 8.3 GM/DL (ref 5.8–7.6)
RBC # BLD AUTO: 4.7 X10(6)/MCL (ref 4.7–6.1)
SODIUM SERPL-SCNC: 141 MMOL/L (ref 136–145)
TRIGL SERPL-MCNC: 106 MG/DL (ref 34–140)
TSH SERPL-ACNC: 2.92 UIU/ML (ref 0.35–4.94)
VLDLC SERPL CALC-MCNC: 21 MG/DL
WBC # BLD AUTO: 5.53 X10(3)/MCL (ref 4.5–11.5)

## 2025-02-21 PROCEDURE — 83036 HEMOGLOBIN GLYCOSYLATED A1C: CPT

## 2025-02-21 PROCEDURE — 86803 HEPATITIS C AB TEST: CPT

## 2025-02-21 PROCEDURE — 80061 LIPID PANEL: CPT

## 2025-02-21 PROCEDURE — 84443 ASSAY THYROID STIM HORMONE: CPT

## 2025-02-21 PROCEDURE — 36415 COLL VENOUS BLD VENIPUNCTURE: CPT

## 2025-02-21 PROCEDURE — 80053 COMPREHEN METABOLIC PANEL: CPT

## 2025-02-21 PROCEDURE — 85025 COMPLETE CBC W/AUTO DIFF WBC: CPT

## 2025-02-22 ENCOUNTER — RESULTS FOLLOW-UP (OUTPATIENT)
Dept: URGENT CARE | Facility: CLINIC | Age: 70
End: 2025-02-22
Payer: MEDICARE

## 2025-02-22 DIAGNOSIS — R76.8 HEPATITIS C ANTIBODY POSITIVE IN BLOOD: Primary | ICD-10-CM

## 2025-02-25 ENCOUNTER — OFFICE VISIT (OUTPATIENT)
Dept: FAMILY MEDICINE | Facility: CLINIC | Age: 70
End: 2025-02-25
Payer: MEDICARE

## 2025-02-25 ENCOUNTER — LAB VISIT (OUTPATIENT)
Dept: LAB | Facility: HOSPITAL | Age: 70
End: 2025-02-25
Attending: FAMILY MEDICINE
Payer: MEDICARE

## 2025-02-25 VITALS
DIASTOLIC BLOOD PRESSURE: 74 MMHG | OXYGEN SATURATION: 97 % | TEMPERATURE: 98 F | HEART RATE: 75 BPM | BODY MASS INDEX: 22.64 KG/M2 | HEIGHT: 68 IN | WEIGHT: 149.38 LBS | SYSTOLIC BLOOD PRESSURE: 138 MMHG | RESPIRATION RATE: 18 BRPM

## 2025-02-25 DIAGNOSIS — Z12.5 SCREENING FOR MALIGNANT NEOPLASM OF PROSTATE: ICD-10-CM

## 2025-02-25 DIAGNOSIS — Z86.19 HISTORY OF HEPATITIS C VIRUS INFECTION: Primary | ICD-10-CM

## 2025-02-25 DIAGNOSIS — N52.9 ERECTILE DYSFUNCTION, UNSPECIFIED ERECTILE DYSFUNCTION TYPE: ICD-10-CM

## 2025-02-25 DIAGNOSIS — Z00.00 ENCOUNTER FOR PREVENTIVE HEALTH EXAMINATION: Primary | ICD-10-CM

## 2025-02-25 DIAGNOSIS — E78.2 MIXED HYPERLIPIDEMIA: ICD-10-CM

## 2025-02-25 DIAGNOSIS — C43.71 MALIGNANT MELANOMA OF RIGHT LOWER LIMB, INCLUDING HIP: ICD-10-CM

## 2025-02-25 LAB
PSA SERPL-MCNC: 1.11 NG/ML
TESTOST SERPL-MCNC: 509.91 NG/DL (ref 220.91–715.81)

## 2025-02-25 PROCEDURE — 84403 ASSAY OF TOTAL TESTOSTERONE: CPT

## 2025-02-25 PROCEDURE — 84153 ASSAY OF PSA TOTAL: CPT

## 2025-02-25 PROCEDURE — 87522 HEPATITIS C REVRS TRNSCRPJ: CPT

## 2025-02-25 PROCEDURE — 36415 COLL VENOUS BLD VENIPUNCTURE: CPT

## 2025-02-25 NOTE — PROGRESS NOTES
"   Internal Medicine    Mina Da Silva is a 70 y.o. male here today for a Medicare Annual Wellness visit and comprehensive Health Risk Assessment. Reviewed labs with patient.     Subjective   The following components were reviewed and updated:  Medical history  Family History  Social history  Allergies  Current Medications  Immunizations  Health Maintenance  Patient Care Team    Review of Systems  A comprehensive review of systems was conducted and is negative except as noted above.     Objective   Visit Vitals  /74 (BP Location: Left arm, Patient Position: Sitting)   Pulse 75   Temp 97.8 °F (36.6 °C)   Resp 18   Ht 5' 8" (1.727 m)   Wt 67.8 kg (149 lb 6.4 oz)   SpO2 97%   BMI 22.72 kg/m²        Physical Exam  Vitals reviewed.   Constitutional:       General: He is not in acute distress.     Appearance: Normal appearance. He is not ill-appearing.   Cardiovascular:      Rate and Rhythm: Normal rate and regular rhythm.      Pulses: Normal pulses.      Heart sounds: Normal heart sounds. No murmur heard.     No friction rub. No gallop.   Pulmonary:      Effort: No respiratory distress.      Breath sounds: No wheezing, rhonchi or rales.   Musculoskeletal:         General: No swelling.      Right lower leg: No edema.      Left lower leg: No edema.   Skin:     General: Skin is warm and dry.   Neurological:      General: No focal deficit present.      Mental Status: He is alert.   Psychiatric:         Mood and Affect: Mood normal.         Behavior: Behavior normal.          Assessment/Plan:  1. Encounter for preventive health examination    2. Screening for malignant neoplasm of prostate  -     PSA, Screening; Future; Expected date: 02/25/2025    3. Malignant melanoma of right lower limb, including hip  Actively followed by Dermatology, S/P excision.   4. Erectile dysfunction, unspecified erectile dysfunction type  -     Testosterone; Future; Expected date: 02/25/2025    5. Mixed hyperlipidemia  -     Lipid Panel; " Future; Expected date: 08/25/2025          A comprehensive HEALTH RISK ASSESSMENT was completed today. Results are summarized below:    There are NO EMOTIONAL/SOCIAL CONCERNS identified on today's screening for Social Isolation, Depression and Anxiety.    There are NO COGNITIVE FUNCTION CONCERNS identified on today's screening.  The following FUNCTIONAL AND/OR SAFETY CONCERNS were identified on today's screening for Physical Symptoms, Nutritional, Home Safety/Living Situation, Fall Risk, Activities of Daily Living, Independent Activities of Daily Living, Physical Activity,Timed Up and Go test and Whisper test::    *Patient reports NO PREVENTIVE HOME HAZARD EVALUATION OR MODIFICATION. (Have you or someone else evaluated or modified your home with additional safety features like handrails on all stairs, installed grab bars in the bathroom, secured loose rugs and ensured good lighting in all areas?: (!) No)    The patient reports NO OPIOID PRESCRIPTIONS. This was confirmed through medication reconciliation and the [facility] website.    The patient is NOT A TOBACCO USER.  The patient reports NO SIGNIFICANT ALCOHOL USE.     All Questions regarding food, transportation or housing were not answered today.    I provided Mina Da Silva with a 5-10 year written Screening Schedule per USPSTF age appropriate recommendations and a Personal Prevention Plan based on the results of today's Health Risk Assessment. Education, counseling, and referrals were provided as documented above and can be viewed in the After Visit Summary.    Follow up in about 6 months (around 8/25/2025) for Follow up cholesterol. In addition to this scheduled follow up, the patient has also been instructed to follow up on as needed basis.     none  The patient was asked and declined the use of a free .  Advance Care Planning   Today we discussed advance care planning. He is interested in learning more about how to make Advance Directives. Information  was provided and I offered to discuss more at his discretion.

## 2025-02-25 NOTE — PATIENT INSTRUCTIONS
Medicare Annual Wellness Visit      Patient Name: Mina Da Silva  Today's Date: 2/25/2025    Below you will find your 5-10 year Screening Plan Recommendations:  Health Maintenance       Date Due Completion Date    TETANUS VACCINE Never done ---    Shingles Vaccine (1 of 2) Never done ---    Pneumococcal Vaccines (Age 50+) (1 of 2 - PCV) Never done ---    RSV Vaccine (Age 60+ and Pregnant patients) (1 - Risk 60-74 years 1-dose series) Never done ---    COVID-19 Vaccine (3 - Moderna risk series) 09/16/2021 8/19/2021    Colorectal Cancer Screening 06/29/2026 6/29/2022    Lipid Panel 02/21/2030 2/21/2025          Below is your summarized Personalized Prevention Plan that addresses any concerns we discussed today at your visit. Please see attached detailed information specific to your Health Concerns.  Orders Placed This Encounter   Procedures    PSA, Screening         The following information is provided to all patients.  This information is to help you find additional resources for any problems that may be affecting your health: Living healthy guide: www.[facility].[place].gov      Understanding Diabetes: www.diabetes.org      Eating healthy: www.cdc.gov/healthyweight      CDC home safety checklist: www.cdc.gov/steadi/patient.html      Agency on Aging: www.goea.[place].gov      Alcoholics anonymous (AA): www.aa.org      Physical Activity: www.matheus.nih.gov/zr7yapk      Tobacco use: www.quitwithusla.org

## 2025-02-27 LAB — HCV RNA SERPL NAA+PROBE-LOG IU: NOT DETECTED {LOG_IU}/ML

## 2025-03-12 ENCOUNTER — RESULTS FOLLOW-UP (OUTPATIENT)
Dept: FAMILY MEDICINE | Facility: CLINIC | Age: 70
End: 2025-03-12

## 2025-03-12 ENCOUNTER — TELEPHONE (OUTPATIENT)
Dept: FAMILY MEDICINE | Facility: CLINIC | Age: 70
End: 2025-03-12
Payer: MEDICARE

## 2025-03-12 NOTE — TELEPHONE ENCOUNTER
----- Message from Robert Welch DO sent at 3/12/2025 10:15 AM CDT -----  All lab results within acceptable ranges.  ----- Message -----  From: Lab, Background User  Sent: 2/25/2025   4:55 PM CDT  To: Robert Welch DO

## 2025-06-01 DIAGNOSIS — I10 ESSENTIAL (PRIMARY) HYPERTENSION: ICD-10-CM

## 2025-06-02 RX ORDER — ENALAPRIL MALEATE 10 MG/1
10 TABLET ORAL
Qty: 90 TABLET | Refills: 4 | Status: SHIPPED | OUTPATIENT
Start: 2025-06-02

## 2025-07-01 ENCOUNTER — TELEPHONE (OUTPATIENT)
Dept: FAMILY MEDICINE | Facility: CLINIC | Age: 70
End: 2025-07-01
Payer: MEDICARE

## 2025-07-01 NOTE — TELEPHONE ENCOUNTER
Copied from CRM #[id number]. Topic: General Inquiry - Patient Advice  >> Jul 1, 2025 11:15 AM Margo wrote:  Who Called: Dr. Josue Yanes's Office     Caller is requesting assistance/information from provider's office.    Symptoms (please be specific):  none  How long has patient had these symptoms:    List of preferred pharmacies on file (remove unneeded): [unfilled]  If different, enter pharmacy into here including location and phone number:       Preferred Method of Contact: Phone Call  Patient's Preferred Phone Number on File: [phone]   Best Call Back Number, if different:[phone]  Additional Information: Requesting a call back regarding patient's referral, states she's missing information from an office visit note.

## 2025-07-01 NOTE — TELEPHONE ENCOUNTER
"Ayala was requesting dr. Yanes's office note from 4/17/24. " Sometimes we fax it and yall scan it in the patients chart. " Not found in [employer]. Stated " that's OK. We will process without it"   "

## 2025-08-26 ENCOUNTER — LAB VISIT (OUTPATIENT)
Dept: LAB | Facility: HOSPITAL | Age: 70
End: 2025-08-26
Attending: FAMILY MEDICINE
Payer: MEDICARE

## 2025-08-28 ENCOUNTER — OFFICE VISIT (OUTPATIENT)
Dept: FAMILY MEDICINE | Facility: CLINIC | Age: 70
End: 2025-08-28
Payer: MEDICARE

## 2025-08-28 VITALS
HEIGHT: 68 IN | OXYGEN SATURATION: 97 % | RESPIRATION RATE: 20 BRPM | TEMPERATURE: 97 F | WEIGHT: 147.81 LBS | BODY MASS INDEX: 22.4 KG/M2 | HEART RATE: 78 BPM | SYSTOLIC BLOOD PRESSURE: 124 MMHG | DIASTOLIC BLOOD PRESSURE: 77 MMHG

## 2025-08-28 DIAGNOSIS — E78.2 MIXED HYPERLIPIDEMIA: ICD-10-CM

## 2025-08-28 DIAGNOSIS — I10 PRIMARY HYPERTENSION: Primary | Chronic | ICD-10-CM

## 2025-08-28 PROCEDURE — 1101F PT FALLS ASSESS-DOCD LE1/YR: CPT | Mod: CPTII,,, | Performed by: FAMILY MEDICINE

## 2025-08-28 PROCEDURE — 3074F SYST BP LT 130 MM HG: CPT | Mod: CPTII,,, | Performed by: FAMILY MEDICINE

## 2025-08-28 PROCEDURE — 4010F ACE/ARB THERAPY RXD/TAKEN: CPT | Mod: CPTII,,, | Performed by: FAMILY MEDICINE

## 2025-08-28 PROCEDURE — 1126F AMNT PAIN NOTED NONE PRSNT: CPT | Mod: CPTII,,, | Performed by: FAMILY MEDICINE

## 2025-08-28 PROCEDURE — 1160F RVW MEDS BY RX/DR IN RCRD: CPT | Mod: CPTII,,, | Performed by: FAMILY MEDICINE

## 2025-08-28 PROCEDURE — 1159F MED LIST DOCD IN RCRD: CPT | Mod: CPTII,,, | Performed by: FAMILY MEDICINE

## 2025-08-28 PROCEDURE — 3078F DIAST BP <80 MM HG: CPT | Mod: CPTII,,, | Performed by: FAMILY MEDICINE

## 2025-08-28 PROCEDURE — 3044F HG A1C LEVEL LT 7.0%: CPT | Mod: CPTII,,, | Performed by: FAMILY MEDICINE

## 2025-08-28 PROCEDURE — 3008F BODY MASS INDEX DOCD: CPT | Mod: CPTII,,, | Performed by: FAMILY MEDICINE

## 2025-08-28 PROCEDURE — 99214 OFFICE O/P EST MOD 30 MIN: CPT | Mod: ,,, | Performed by: FAMILY MEDICINE

## 2025-08-28 PROCEDURE — 3288F FALL RISK ASSESSMENT DOCD: CPT | Mod: CPTII,,, | Performed by: FAMILY MEDICINE
